# Patient Record
Sex: MALE | Race: WHITE | NOT HISPANIC OR LATINO | Employment: FULL TIME | URBAN - METROPOLITAN AREA
[De-identification: names, ages, dates, MRNs, and addresses within clinical notes are randomized per-mention and may not be internally consistent; named-entity substitution may affect disease eponyms.]

---

## 2022-11-30 ENCOUNTER — OFFICE VISIT (OUTPATIENT)
Dept: FAMILY MEDICINE CLINIC | Facility: CLINIC | Age: 42
End: 2022-11-30

## 2022-11-30 VITALS
HEART RATE: 72 BPM | HEIGHT: 76 IN | SYSTOLIC BLOOD PRESSURE: 124 MMHG | DIASTOLIC BLOOD PRESSURE: 80 MMHG | BODY MASS INDEX: 38.36 KG/M2 | WEIGHT: 315 LBS | TEMPERATURE: 96.6 F | OXYGEN SATURATION: 96 %

## 2022-11-30 DIAGNOSIS — L91.8 MULTIPLE ACQUIRED SKIN TAGS: ICD-10-CM

## 2022-11-30 DIAGNOSIS — R79.89 LOW TESTOSTERONE IN MALE: ICD-10-CM

## 2022-11-30 DIAGNOSIS — Z11.4 ENCOUNTER FOR SCREENING FOR HIV: ICD-10-CM

## 2022-11-30 DIAGNOSIS — Z11.59 SCREENING FOR VIRAL DISEASE: ICD-10-CM

## 2022-11-30 DIAGNOSIS — Z11.3 ROUTINE SCREENING FOR STI (SEXUALLY TRANSMITTED INFECTION): Primary | ICD-10-CM

## 2022-11-30 DIAGNOSIS — Z23 NEED FOR INFLUENZA VACCINATION: ICD-10-CM

## 2022-11-30 DIAGNOSIS — Z11.59 ENCOUNTER FOR HEPATITIS C SCREENING TEST FOR LOW RISK PATIENT: ICD-10-CM

## 2022-11-30 DIAGNOSIS — N52.9 ERECTILE DYSFUNCTION, UNSPECIFIED ERECTILE DYSFUNCTION TYPE: ICD-10-CM

## 2022-11-30 PROCEDURE — 99203 OFFICE O/P NEW LOW 30 MIN: CPT | Performed by: NURSE PRACTITIONER

## 2022-11-30 PROCEDURE — 90471 IMMUNIZATION ADMIN: CPT | Performed by: NURSE PRACTITIONER

## 2022-11-30 PROCEDURE — 90686 IIV4 VACC NO PRSV 0.5 ML IM: CPT | Performed by: NURSE PRACTITIONER

## 2022-12-01 DIAGNOSIS — Z11.59 ENCOUNTER FOR HEPATITIS C SCREENING TEST FOR LOW RISK PATIENT: ICD-10-CM

## 2022-12-01 DIAGNOSIS — Z11.59 SCREENING FOR VIRAL DISEASE: ICD-10-CM

## 2022-12-01 DIAGNOSIS — Z11.3 ROUTINE SCREENING FOR STI (SEXUALLY TRANSMITTED INFECTION): ICD-10-CM

## 2022-12-01 DIAGNOSIS — Z11.4 ENCOUNTER FOR SCREENING FOR HIV: ICD-10-CM

## 2022-12-01 DIAGNOSIS — R79.89 LOW TESTOSTERONE IN MALE: ICD-10-CM

## 2022-12-02 ENCOUNTER — PATIENT ROUNDING (BHMG ONLY) (OUTPATIENT)
Dept: FAMILY MEDICINE CLINIC | Facility: CLINIC | Age: 42
End: 2022-12-02

## 2022-12-02 LAB
C TRACH RRNA SPEC QL NAA+PROBE: NEGATIVE
N GONORRHOEA RRNA SPEC QL NAA+PROBE: NEGATIVE
T VAGINALIS RRNA SPEC QL NAA+PROBE: NEGATIVE

## 2022-12-02 NOTE — PROGRESS NOTES
A [employer] message has been sent to patient rounding with [facility].

## 2022-12-04 NOTE — ASSESSMENT & PLAN NOTE
BMI 39.95 today.  Recommended annual physical w/labs within the next 1 to 3 months; Patient declined recommendation and requested annual physical in 1 year.

## 2022-12-29 ENCOUNTER — TELEPHONE (OUTPATIENT)
Dept: FAMILY MEDICINE CLINIC | Facility: CLINIC | Age: 42
End: 2022-12-29

## 2022-12-29 NOTE — TELEPHONE ENCOUNTER
Caller: Roberto Carlos Mar    Relationship: Self    Best call back number: [phone]    What is the medical concern/diagnosis: PAIN IN BACK, HISTORY OF HERNIATED DISCS    What specialty or service is being requested: ORTHOPEDIC     What is the provider, practice or medical service name: WHOEVER TUNDE LAWRENCE WOULD RECOMMEND     What is the office location:     What is the office phone number:     Any additional details: PREVIOUSLY RECEIVED CORTISONE SHOTS FOR THIS.

## 2023-01-11 ENCOUNTER — OFFICE VISIT (OUTPATIENT)
Dept: FAMILY MEDICINE CLINIC | Facility: CLINIC | Age: 43
End: 2023-01-11
Payer: COMMERCIAL

## 2023-01-11 VITALS
BODY MASS INDEX: 38.36 KG/M2 | HEART RATE: 65 BPM | WEIGHT: 315 LBS | OXYGEN SATURATION: 98 % | SYSTOLIC BLOOD PRESSURE: 120 MMHG | HEIGHT: 76 IN | DIASTOLIC BLOOD PRESSURE: 60 MMHG

## 2023-01-11 DIAGNOSIS — M54.50 LOW BACK PAIN RADIATING TO RIGHT LEG: Primary | ICD-10-CM

## 2023-01-11 DIAGNOSIS — M79.604 LOW BACK PAIN RADIATING TO RIGHT LEG: Primary | ICD-10-CM

## 2023-01-11 PROCEDURE — 99213 OFFICE O/P EST LOW 20 MIN: CPT | Performed by: NURSE PRACTITIONER

## 2023-01-11 NOTE — PROGRESS NOTES
Chief Complaint  Back Pain (2 and half weeks ago. Its an old injury.)    Subjective          Roberto Carlos Mar presents to [facility]  History of Present Illness  Roberto Carlos Mar is a 42 year old male here with c/o of low back pain.  Over 20 years ago he thinks he injured his back at the gym and did not notice at the time until the next day.  Back then he lived in [place] and they did back scans on his back and was told he had DDD in lower back.  About 8 years ago, when he lived in [place], he had another scan and was told he had about 2 herniated discs in lower back.  He does not have records from these images.  In [place], when pain got bad he got steroid shots in spine and it would help totally take away pain.  Recently about 2.5 weeks ago he was cleaning the house and the next day he had difficulty getting out of bed.  About 2 weeks ago after he couldn't get out of bed he called a telehealth service who prescribed Ibuprofen 800mg and muscle relaxer and that helped a little.  Pain level today 2/10 when standing, when sit 6/8-10.  Last night when trying to sit on the couch his pain level was pushing a 10/10.  Also he have difficulty sleeping, just hard to move at this point.  Laying flat on the floor and standing helps relieve pain.  Pain radiates down right leg down to knee or mid shin.  Pain feels like pinching, tight, burning, and shooting.  He has been living in [place] now for about 2-3 months and has never seen anyone for this problem since he moved here. He reports going to physical therapy in the past but did not feel like it help but that was a long time ago.          Review of Systems   Constitutional: Negative for chills.   Respiratory: Negative for shortness of breath.    Cardiovascular: Negative for palpitations and leg swelling.   Gastrointestinal: Negative for constipation and diarrhea.   Genitourinary: Negative for difficulty urinating.   Musculoskeletal: Positive for back  "pain and gait problem. Negative for arthralgias, joint swelling, myalgias, neck pain and neck stiffness.        When pain severe causes him to limp.  Pain from low back down right leg to knee or mid shin.         Objective   Vital Signs:   /60 (BP Location: Left arm)   Pulse 65   Ht 193 cm (76\")   Wt (!) 151 kg (332 lb 9.6 oz)   SpO2 98%   BMI 40.49 kg/m²     Physical Exam  Vitals and nursing note reviewed.   Constitutional:       Appearance: Normal appearance.   HENT:      Head: Normocephalic and atraumatic.   Cardiovascular:      Rate and Rhythm: Normal rate and regular rhythm.      Heart sounds: Normal heart sounds. No murmur heard.  Pulmonary:      Effort: Pulmonary effort is normal.      Breath sounds: Normal breath sounds. No wheezing or rhonchi.   Musculoskeletal:         General: No swelling or tenderness. Normal range of motion.      Lumbar back: No swelling, edema, signs of trauma or tenderness. Normal range of motion. Negative right straight leg raise test and negative left straight leg raise test.        Back:       Right lower leg: No edema.      Left lower leg: No edema.      Comments: Lumbar ROM normal.   Skin:     General: Skin is warm and dry.   Neurological:      Mental Status: He is alert.   Psychiatric:         Mood and Affect: Mood normal.        Result Review :   The following data was reviewed by: TUNDE Lebron on 01/11/2023:  CBC w/diff    CBC w/Diff 12/2/22   WBC 4.10   RBC 5.30   Hemoglobin 16.7   Hematocrit 50.8   MCV 95.8   MCH 31.5   MCHC 32.9   RDW 12.6   Platelets 294   Neutrophil Rel % 45.3   Lymphocyte Rel % 39.8   Monocyte Rel % 11.5   Eosinophil Rel % 1.7   Basophil Rel % 1.7 (A)   (A) Abnormal value                      Assessment and Plan    Diagnoses and all orders for this visit:    1. Low back pain radiating to right leg (Primary)  Assessment & Plan:  Chronic intermittent low back pain.  Continue Ibuprofen 800mg (recent RX) PRN pain.  Discussed importance " of eating with medication.  Alternate ice/heat.  Discussed and recommended referral to physical therapy and patient agreed.  ORDER:  X-ray Lumbar Spine  Follow-up in 2 months for re-evaluation.    Orders:  -     Ambulatory Referral to Physical Therapy Evaluate and treat  -     XR Spine Lumbar 2 or 3 View    I spent 20 minutes caring for Roberto Carlos on this date of service. This time includes time spent by me in the following activities:preparing for the visit, performing a medically appropriate examination and/or evaluation , counseling and educating the patient/family/caregiver, ordering medications, tests, or procedures and documenting information in the medical record  Follow Up   Return in about 2 months (around 3/11/2023) for Next scheduled follow up Low Back Pain .  Patient was given instructions and counseling regarding his condition or for health maintenance advice. Please see specific information pulled into the AVS if appropriate.

## 2023-01-15 ENCOUNTER — TELEPHONE (OUTPATIENT)
Dept: FAMILY MEDICINE CLINIC | Facility: CLINIC | Age: 43
End: 2023-01-15
Payer: COMMERCIAL

## 2023-01-15 NOTE — TELEPHONE ENCOUNTER
Called patient and discussed Lumbar x-ray still pending.  He said he was going to get x-ray sometime this week.  I also discussed his kidney blood levels are elevated and recommended he stop taking Ibuprofen 800mg and to take Tylenol instead and he agreed.

## 2023-01-15 NOTE — ASSESSMENT & PLAN NOTE
Chronic intermittent low back pain.  Continue Ibuprofen 800mg (recent RX) PRN pain.  Discussed importance of eating with medication.  Alternate ice/heat.  Discussed and recommended referral to physical therapy and patient agreed.  ORDER:  X-ray Lumbar Spine  Follow-up in 2 months for re-evaluation.

## 2023-01-17 ENCOUNTER — HOSPITAL ENCOUNTER (OUTPATIENT)
Dept: GENERAL RADIOLOGY | Facility: HOSPITAL | Age: 43
Discharge: HOME | End: 2023-01-17
Admitting: NURSE PRACTITIONER
Payer: COMMERCIAL

## 2023-01-17 PROCEDURE — 72100 X-RAY EXAM L-S SPINE 2/3 VWS: CPT

## 2023-01-19 DIAGNOSIS — M54.50 LOW BACK PAIN RADIATING TO RIGHT LEG: Primary | ICD-10-CM

## 2023-01-19 DIAGNOSIS — M79.604 LOW BACK PAIN RADIATING TO RIGHT LEG: Primary | ICD-10-CM

## 2023-01-20 ENCOUNTER — TELEPHONE (OUTPATIENT)
Dept: FAMILY MEDICINE CLINIC | Facility: CLINIC | Age: 43
End: 2023-01-20
Payer: COMMERCIAL

## 2023-01-20 NOTE — TELEPHONE ENCOUNTER
Hub please inform patient  I called patient and left voicemail.  Please call patient and let him know that I sent the referral over to Orthopedic specialist and that they will call him directly to schedule appointment.

## 2023-01-20 NOTE — TELEPHONE ENCOUNTER
----- Message from TUNDE Lebron sent at 1/19/2023 11:54 PM EST -----  Please call patient and let him know that I sent the referral over to Orthopedic specialist and that they will call him directly to schedule appointment.    Thank you,    TUNDE Suárez

## 2023-02-05 PROCEDURE — 87086 URINE CULTURE/COLONY COUNT: CPT | Performed by: NURSE PRACTITIONER

## 2023-02-13 ENCOUNTER — OFFICE VISIT (OUTPATIENT)
Dept: ORTHOPEDIC SURGERY | Facility: CLINIC | Age: 43
End: 2023-02-13
Payer: COMMERCIAL

## 2023-02-13 VITALS — TEMPERATURE: 97.1 F | WEIGHT: 315 LBS | HEIGHT: 76 IN | BODY MASS INDEX: 38.36 KG/M2

## 2023-02-13 DIAGNOSIS — M79.604 LOW BACK PAIN RADIATING TO RIGHT LEG: Primary | ICD-10-CM

## 2023-02-13 DIAGNOSIS — M54.50 LOW BACK PAIN RADIATING TO RIGHT LEG: Primary | ICD-10-CM

## 2023-02-13 PROCEDURE — 99214 OFFICE O/P EST MOD 30 MIN: CPT | Performed by: NURSE PRACTITIONER

## 2023-02-13 RX ORDER — METHYLPREDNISOLONE 4 MG/1
TABLET ORAL
Qty: 21 TABLET | Refills: 0 | Status: SHIPPED | OUTPATIENT
Start: 2023-02-13

## 2023-02-13 NOTE — PROGRESS NOTES
Patient Name: Roberto Carlos Mar   YOB: 1980  Referring Primary Care Physician: Zeenat Rice APRN      Chief Complaint:    Chief Complaint   Patient presents with   • Lumbar Spine - Initial Evaluation, Pain        HPI:  Roebrto Carlos Mar is a 42 y.o. male who presents to [facility] for evaluation of low back pain referring to right lower extremity.  He has a long history of chronic back problems.  He recently moved from [place] where he had been getting epidurals for about 8 years.  He says he will have a flareup about once per year and epidurals generally will turn around.  He has been in therapy recently and several times in the past.  He continues to exercises on his own.  He does state the right lower extremity pain is new and current flareup has been going on 7 to 8 weeks without injury.  He has tried muscle relaxants and OTC medications without much relief.  This is a new patient to the practice new to me.  Prior pertinent records were reviewed.    PFSH:  See attached    ROS: As per HPI, otherwise negative    Objective:    Vitals:    02/13/23 1345   Temp: 97.1 °F (36.2 °C)     Body mass index is 38.85 kg/m².      Physical Exam  Neurological:      Gait: Gait is intact.       Spine Musculoskeletal Exam    Gait    Gait is normal.    Palpation    Thoracolumbar    Tenderness: present    Strength    Thoracolumbar    Thoracolumbar motor exam is normal.     Sensory    Thoracolumbar    Thoracolumbar sensation is normal.    Reflexes    Right      Quadriceps: 0/4      Achilles: 0/4     Left      Quadriceps: 0/4      Achilles: 0/4    Special Tests    Thoracolumbar      Right      SLR: pain radiates to right leg      SLR pain at: 90 degrees      Left      SLR: no back or leg pain        IMAGING:      No imaging in office today.  He did have lumbar AP and lateral films on 1/17/2023 which revealed maintenance of normal lordosis, mild retrolisthesis L3 on L4, L4 on L5  and L5 on S1, disc base narrowing most pronounced L4-5 and L5-S1, multilevel facet arthropathy.  Agree with report    Assessment:           Diagnoses and all orders for this visit:    1. Low back pain radiating to right leg (Primary)  -     MRI Lumbar Spine Without Contrast; Future    Other orders  -     methylPREDNISolone (MEDROL) 4 MG dose pack; Use as directed by package instructions  Dispense: 21 tablet; Refill: 0             Plan:  No loss of nerve function on exam, however with persistence of symptoms despite physical therapy and OTC medications, we will get a lumbar MRI.  He says his last lumbar MRI was probably 8 years ago in [place].  We will have him follow-up afterwards and likely refer to pain management to continue epidural injections since they have worked well for him in the past.  Of course if there is anything of surgical significance, we will get him into [facility] neurosurgery.  We also discussed weight loss for his overall health and chronic back issues and he is fully aware and tries to stay active.  For the current flareup, will prescribe Medrol Dosepak, side effects discussed.  Discussed trialing muscle relaxants as well but he states they never worked well for him in the past.    Return for After radiographic tests.    The diagnosis(es), natural history, pathophysiology and treatment for diagnosis(es) were discussed. Opportunity given and questions answered. Biomechanics of pertinent body areas discussed.    EXERCISES:  Advice on benefits of, and types of regular/moderate exercise pertaining to diagnosis.  Continue HEP.  MEDICATIONS:  The risks, benefits, warnings,side effects and alternatives of medications discussed. Advised against long term use of narcotics.   PAIN CONTROL:  Cold, heat, OTC lidocaine patches and ointment as needed.  MEDICAL RECORDS reviewed from other provider(s) for past and current medical history pertinent to this visit..

## 2023-04-18 ENCOUNTER — TELEPHONE (OUTPATIENT)
Dept: ORTHOPEDIC SURGERY | Facility: CLINIC | Age: 43
End: 2023-04-18
Payer: COMMERCIAL

## 2023-04-18 DIAGNOSIS — M51.16 LUMBAR DISC HERNIATION WITH RADICULOPATHY: Primary | ICD-10-CM

## 2023-04-18 NOTE — TELEPHONE ENCOUNTER
Called patient with results, although I do not have an official radiology interpretation, he has a large disc herniation to the right with caudal migration at L3-4.  Likely his symptomatic level.  He has no left leg pain.  He was getting epidural injections in [place].  He says his pain is fairly stable right now but would like the referral to pain management to try epidural injections when this flares back up.  He also understands that if he develops any weakness or epidurals fail, he can call and we will get him into one of the neurosurgeons.  He has already gone through physical therapy and continues those exercises.

## 2023-05-01 ENCOUNTER — OFFICE VISIT (OUTPATIENT)
Dept: PAIN MEDICINE | Facility: CLINIC | Age: 43
End: 2023-05-01
Payer: COMMERCIAL

## 2023-05-01 VITALS
OXYGEN SATURATION: 96 % | RESPIRATION RATE: 18 BRPM | BODY MASS INDEX: 38.36 KG/M2 | HEIGHT: 76 IN | WEIGHT: 315 LBS | HEART RATE: 81 BPM | DIASTOLIC BLOOD PRESSURE: 88 MMHG | TEMPERATURE: 98 F | SYSTOLIC BLOOD PRESSURE: 135 MMHG

## 2023-05-01 DIAGNOSIS — M51.26 DISPLACEMENT OF LUMBAR INTERVERTEBRAL DISC WITHOUT MYELOPATHY: Primary | ICD-10-CM

## 2023-05-01 DIAGNOSIS — M54.16 LUMBAR RADICULOPATHY: ICD-10-CM

## 2023-05-01 DIAGNOSIS — M51.36 DDD (DEGENERATIVE DISC DISEASE), LUMBAR: ICD-10-CM

## 2023-05-01 PROBLEM — M51.369 DDD (DEGENERATIVE DISC DISEASE), LUMBAR: Status: ACTIVE | Noted: 2023-05-01

## 2023-05-01 NOTE — PROGRESS NOTES
CHIEF COMPLAINT  Mr. Mar has had low back pain for over 20 years.     Subjective   Roberto Carlos Mar is a 43 y.o. male.   He presents to the office for initial evaluation of low back and leg pain. He was referred here by TUNDE Escobar.  This patient has a longstanding history of low back pain dating over 20 years that is intermittent in its occurrences.  The patient states that there is not any specific thing that will trigger onset of pain which usually will shruti after several days however when he has severe occurrences they can last up to 2 months.  Patient illustrates primarily right-sided lumbosacral spine pain which radiates into the anterior right thigh and radiates into the lower extremity terminating in the mid calf region.  Pain is described as a sharp, burning sensation which feels like railroad spikes being driven into his lower back.  Also reports that his leg pain is with concomitant tingling and occasionally numbness and very rare occurrences of weakness.  Denies bladder or bowel incontinence.    Patient denies any previous history of cervical or lumbar spine surgery.    He has been in interventional pain management while living in [place] where he obtains therapeutic LESI's which have been very helpful in helping him to control his pain.  The patient believes that the last time he obtained a LESI was approximately 4 years ago.    Completed a formalized course of physical therapy with mixed results.  The patient does perform physician guided HEP along with stretching exercises on a daily basis.  He will also alternate with ice and heat therapy when he has 6 significant flares of pain.    Pain today 0/10 VAS in severity.        Back Pain  This is a recurrent problem. The current episode started more than 1 year ago. The problem occurs intermittently. The problem has been waxing and waning since onset. The pain is present in the lumbar spine. The quality of the pain is described as burning and  "shooting (sharp). Radiates to: RLE. The pain is at a severity of 0/10. The patient is experiencing no pain. The symptoms are aggravated by position. Associated symptoms include leg pain, numbness (right thigh) and tingling. Pertinent negatives include no weakness. He has tried heat, bed rest, home exercises, ice and NSAIDs for the symptoms. The treatment provided no relief.        PEG Assessment   What number best describes your pain on average in the past week?0  What number best describes how, during the past week, pain has interfered with your enjoyment of life?0  What number best describes how, during the past week, pain has interfered with your general activity?  0        Current Outpatient Medications:   •  Testosterone Cypionate (DEPOTESTOTERONE CYPIONATE) 200 MG/ML injection, Inject 1 mL into the appropriate muscle as directed by prescriber., Disp: , Rfl:     The following portions of the patient's history were reviewed and updated as appropriate: allergies, current medications, past family history, past medical history, past social history, past surgical history and problem list.      REVIEW OF PERTINENT MEDICAL DATA              Review of Systems   Gastrointestinal: Negative for constipation and diarrhea.   Genitourinary: Negative for difficulty urinating.   Musculoskeletal: Negative for back pain.   Neurological: Positive for tingling and numbness (right thigh). Negative for weakness.   Psychiatric/Behavioral: Negative for sleep disturbance and suicidal ideas. The patient is not nervous/anxious.      I have reviewed and confirmed the accuracy of the ROS as documented by the MA/LPN/RN DENITA Marks      Vitals:    05/01/23 0800   BP: 135/88   Pulse: 81   Resp: 18   Temp: 98 °F (36.7 °C)   SpO2: 96%   Weight: (!) 147 kg (323 lb 12.6 oz)   Height: 193 cm (76\")   PainSc: 0-No pain         Objective   Physical Exam  Vitals and nursing note reviewed.   Constitutional:       Appearance: Normal appearance. "   HENT:      Head: Normocephalic.   Pulmonary:      Effort: Pulmonary effort is normal.   Musculoskeletal:      Lumbar back: Decreased range of motion.        Back:       Comments: Tenderness to palpation is not reproducible   Skin:     General: Skin is warm and dry.   Neurological:      General: No focal deficit present.      Mental Status: He is alert and oriented to person, place, and time.      Cranial Nerves: Cranial nerves 2-12 are intact.      Sensory: Sensory deficit (mild decreased to sensation along lateral aspect of right thigh) present.      Motor: Motor function is intact.      Gait: Gait is intact.      Deep Tendon Reflexes:      Reflex Scores:       Patellar reflexes are 0 on the right side and 0 on the left side.       Achilles reflexes are 0 on the right side and 0 on the left side.  Psychiatric:         Mood and Affect: Mood normal.         Behavior: Behavior normal.         Thought Content: Thought content normal.         Judgment: Judgment normal.         Assessment & Plan   Diagnoses and all orders for this visit:    1. Displacement of lumbar intervertebral disc without myelopathy (Primary)    2. Lumbar radiculopathy    3. DDD (degenerative disc disease), lumbar        --- Follow-up as needed as patient will contact the office whenever he experiences the flare of low back and right lower extremity pain in order to come in for therapeutic LESI.  --- I will have the patient's sign a medical release in order for us to obtain his records from his pain management practice in [place].       Pain / Disability Scale    The scale used for measurement of pain and/or disability for this patient was the [place] back pain disability scale.  The score was 0 on 05/01/2023      MARY REPORT    MARY report has been reviewed and scanned into the patient's chart.    As the clinician, I personally reviewed the MARY from 5/1/2023 while the patient was in the office today.         Dictated utilizing Dragon  dictation.

## 2023-08-11 NOTE — PROGRESS NOTES
HEMATOLOGY ONCOLOGY OUTPATIENT CONSULTATION       Patient name: Roberto Carlos Mar  : 1980  MRN: [id number]  Primary Care Physician: Zeenat Rice APRN  Referring Physician: Zeenat Rice APRN  Reason For Consult: Polycythemia      History of Present Illness:  Patient is a 43 y.o. male who was referred for polycythemia.  Patient history of hypogonadism and has used testosterone supplementation.    Patient has history of fatigue, he denies any history of smoking he has had some weight loss over the last few weeks.  His last testosterone was few weeks prior.  He has been using 0.8 mL intramuscular weekly.  Patient is also been donating blood his last blood donation was in 2023 -WBC 4.5, hemoglobin 18.7, hematocrit 53.7    2023 -WBC 5.92, hemoglobin 16.2, hematocrit 47.2    Subjective:  Patient presents for initial consultation       Past Medical History:   Diagnosis Date    Erectile dysfunction 10/1/2022    Lumbosacral disc disease 2001       Past Surgical History:   Procedure Laterality Date    NO PAST SURGERIES         No current outpatient medications on file.    No Known Allergies    Family History   Problem Relation Age of Onset    Diabetes Father         Type 2       Cancer-related family history is not on file.      Social History     Tobacco Use    Smoking status: Never     Passive exposure: Never    Smokeless tobacco: Never   Vaping Use    Vaping Use: Never used   Substance Use Topics    Alcohol use: Yes     Alcohol/week: 4.0 standard drinks     Types: 4 Cans of beer per week    Drug use: Never     Social History     Social History Narrative    Not on file       ROS:   Review of Systems   Constitutional:  Positive for fatigue. Negative for fever.   HENT:  Negative for congestion and nosebleeds.    Eyes:  Negative for pain.   Respiratory:  Negative for cough and shortness of breath.    Cardiovascular:  Negative for chest pain.  "  Gastrointestinal:  Negative for abdominal pain, blood in stool, diarrhea, nausea and vomiting.   Endocrine: Negative for cold intolerance and heat intolerance.   Genitourinary:  Negative for difficulty urinating.   Musculoskeletal:  Negative for arthralgias.   Skin:  Negative for rash.   Neurological:  Negative for dizziness, weakness and headaches.   Hematological:  Does not bruise/bleed easily.   Psychiatric/Behavioral:  Negative for behavioral problems.        Objective:    Vital Signs:  Vitals:    08/14/23 1031   BP: 136/80   Pulse: 64   Resp: 16   Temp: 98.4 øF (36.9 øC)   SpO2: 98%   Weight: (!) 142 kg (312 lb 12.8 oz)   Height: 193 cm (76\")   PainSc: 0-No pain     Body mass index is 38.08 kg/mý.    ECOG  (1) Restricted in physically strenuous activity, ambulatory and able to do work of light nature    Physical Exam:   Physical Exam  Constitutional:       Appearance: Normal appearance.   HENT:      Head: Normocephalic and atraumatic.   Eyes:      Pupils: Pupils are equal, round, and reactive to light.   Cardiovascular:      Rate and Rhythm: Normal rate and regular rhythm.      Pulses: Normal pulses.      Heart sounds: No murmur heard.  Pulmonary:      Effort: Pulmonary effort is normal.      Breath sounds: Normal breath sounds.   Abdominal:      General: There is no distension.      Palpations: Abdomen is soft. There is no mass.      Tenderness: There is no abdominal tenderness.   Musculoskeletal:         General: Normal range of motion.      Cervical back: Normal range of motion.   Skin:     General: Skin is warm.   Neurological:      General: No focal deficit present.      Mental Status: He is alert.   Psychiatric:         Mood and Affect: Mood normal.       Lab Results - Last 18 Months   Lab Units 08/14/23  1013 12/02/22  0849   WBC 10*3/mm3 5.92 4.10   HEMOGLOBIN g/dL 16.2 16.7   HEMATOCRIT % 47.2 50.8   PLATELETS 10*3/mm3 221 294   MCV fL 94.2 95.8     Lab Results - Last 18 Months   Lab Units " 12/02/22  0849   SODIUM mmol/L 139   POTASSIUM mmol/L 5.1   CHLORIDE mmol/L 102   CO2 mmol/L 26.5   BUN mg/dL 18   CREATININE mg/dL 1.35*   CALCIUM mg/dL 9.7   BILIRUBIN mg/dL 0.5   ALK PHOS U/L 56   ALT (SGPT) U/L 37   AST (SGOT) U/L 21   GLUCOSE mg/dL 91       Lab Results   Component Value Date    GLUCOSE 91 12/02/2022    BUN 18 12/02/2022    CREATININE 1.35 (H) 12/02/2022    BCR 13.3 12/02/2022    K 5.1 12/02/2022    CO2 26.5 12/02/2022    CALCIUM 9.7 12/02/2022    PROTENTOTREF 7.5 12/02/2022    ALBUMIN 5.00 12/02/2022    LABIL2 2.0 12/02/2022    AST 21 12/02/2022    ALT 37 12/02/2022       No results for input(s): APTT, INR, PTT in the last 52146 hours.    No results found for: IRON, TIBC, FERRITIN    No results found for: FOLATE    No results found for: OCCULTBLD    No results found for: RETICCTPCT  No results found for: MOIZHQVC37  No results found for: SPEP, UPEP  No results found for: LDH, URICACID  No results found for: THERESA, RF, SEDRATE  No results found for: FIBRINOGEN, HAPTOGLOBIN  No results found for: PTT, INR  No results found for:   No results found for: CEA  No components found for: CA-19-9  No results found for: PSA  No results found for: SEDRATE       Assessment & Plan     Patient is a 43-year-old male with hypogonadism on testosterone supplementation referred for work-up of erythrocytosis    Erythrocytosis  Patient has secondary erythrocytosis most likely given recent testosterone use   Since his last CBC in June patient has had blood donation and also has stopped testosterone use  Now his hemoglobin is within normal limits hematocrit is less than 50  He denies any significant constitutional symptoms other than fatigue which can be explained by hypogonadism  Sleep study has been ordered for the patient  I have advised the patient that if he needs to be on testosterone supplementation he can start doing periodic phlebotomy/blood donation to keep his hematocrit less than 50  Discussed the risk  of stroke if he has uncontrolled erythrocytosis  No further diagnostic work-up is warranted given his hemoglobin has normalized  I will repeat on follow-up to assess for phlebotomy needs we can also consider adding low-dose aspirin if hematocrit increases again      Thank you very much for providing the opportunity to participate in this patient's care. Please do not hesitate to call if there are any other questions.

## 2023-08-14 ENCOUNTER — APPOINTMENT (OUTPATIENT)
Dept: LAB | Facility: HOSPITAL | Age: 43
End: 2023-08-14
Payer: COMMERCIAL

## 2023-08-14 ENCOUNTER — CONSULT (OUTPATIENT)
Dept: ONCOLOGY | Facility: CLINIC | Age: 43
End: 2023-08-14
Payer: COMMERCIAL

## 2023-08-14 VITALS
RESPIRATION RATE: 16 BRPM | HEIGHT: 76 IN | SYSTOLIC BLOOD PRESSURE: 136 MMHG | WEIGHT: 312.8 LBS | BODY MASS INDEX: 38.09 KG/M2 | DIASTOLIC BLOOD PRESSURE: 80 MMHG | OXYGEN SATURATION: 98 % | HEART RATE: 64 BPM | TEMPERATURE: 98.4 F

## 2023-08-14 DIAGNOSIS — D75.1 POLYCYTHEMIA: Primary | ICD-10-CM

## 2023-08-14 LAB
BASOPHILS # BLD AUTO: 0.04 10*3/MM3 (ref 0–0.2)
BASOPHILS NFR BLD AUTO: 0.7 % (ref 0–1.5)
DEPRECATED RDW RBC AUTO: 43 FL (ref 37–54)
EOSINOPHIL # BLD AUTO: 0.13 10*3/MM3 (ref 0–0.4)
EOSINOPHIL NFR BLD AUTO: 2.2 % (ref 0.3–6.2)
ERYTHROCYTE [DISTWIDTH] IN BLOOD BY AUTOMATED COUNT: 12.7 % (ref 12.3–15.4)
HCT VFR BLD AUTO: 47.2 % (ref 37.5–51)
HGB BLD-MCNC: 16.2 G/DL (ref 13–17.7)
LYMPHOCYTES # BLD AUTO: 1.89 10*3/MM3 (ref 0.7–3.1)
LYMPHOCYTES NFR BLD AUTO: 31.9 % (ref 19.6–45.3)
MCH RBC QN AUTO: 32.3 PG (ref 26.6–33)
MCHC RBC AUTO-ENTMCNC: 34.3 G/DL (ref 31.5–35.7)
MCV RBC AUTO: 94.2 FL (ref 79–97)
MONOCYTES # BLD AUTO: 0.89 10*3/MM3 (ref 0.1–0.9)
MONOCYTES NFR BLD AUTO: 15 % (ref 5–12)
NEUTROPHILS NFR BLD AUTO: 2.97 10*3/MM3 (ref 1.7–7)
NEUTROPHILS NFR BLD AUTO: 50.2 % (ref 42.7–76)
PLATELET # BLD AUTO: 221 10*3/MM3 (ref 140–450)
PMV BLD AUTO: 10.2 FL (ref 6–12)
RBC # BLD AUTO: 5.01 10*6/MM3 (ref 4.14–5.8)
WBC NRBC COR # BLD: 5.92 10*3/MM3 (ref 3.4–10.8)

## 2023-08-14 PROCEDURE — 99204 OFFICE O/P NEW MOD 45 MIN: CPT | Performed by: INTERNAL MEDICINE

## 2023-08-14 PROCEDURE — 85025 COMPLETE CBC W/AUTO DIFF WBC: CPT | Performed by: INTERNAL MEDICINE

## 2023-08-14 NOTE — LETTER
2023     José Miguel Dixon MD  [address] [place] [address] [place] [address]    Patient: Roberto Carlos Mar   YOB: 1980   Date of Visit: 2023     Dear José Miguel Dixon MD:       Thank you for referring Roberto Carlos Mar to me for evaluation. Below are the relevant portions of my assessment and plan of care.    If you have questions, please do not hesitate to call me. I look forward to following Roberto Carlos along with you.         Sincerely,        Soraida Zhao MD        CC: TUNDE Lebron Amitoj, MD  23 1648  Sign when Signing Visit                           HEMATOLOGY ONCOLOGY OUTPATIENT CONSULTATION       Patient name: Roberto Carlos Mar  : 1980  MRN: [id number]  Primary Care Physician: Zeenat Rice APRN  Referring Physician: Zeenat Rice APRN  Reason For Consult: Polycythemia      History of Present Illness:  Patient is a 43 y.o. male who was referred for polycythemia.  Patient history of hypogonadism and has used testosterone supplementation.    Patient has history of fatigue, he denies any history of smoking he has had some weight loss over the last few weeks.  His last testosterone was few weeks prior.  He has been using 0.8 mL intramuscular weekly.  Patient is also been donating blood his last blood donation was in 2023 -WBC 4.5, hemoglobin 18.7, hematocrit 53.7    2023 -WBC 5.92, hemoglobin 16.2, hematocrit 47.2    Subjective:  Patient presents for initial consultation       Past Medical History:   Diagnosis Date    Erectile dysfunction 10/1/2022    Lumbosacral disc disease 2001       Past Surgical History:   Procedure Laterality Date    NO PAST SURGERIES         No current outpatient medications on file.    No Known Allergies    Family History   Problem Relation Age of Onset    Diabetes Father         Type 2       Cancer-related family history is not on file.      Social History     Tobacco Use    Smoking status: Never  "    Passive exposure: Never    Smokeless tobacco: Never   Vaping Use    Vaping Use: Never used   Substance Use Topics    Alcohol use: Yes     Alcohol/week: 4.0 standard drinks     Types: 4 Cans of beer per week    Drug use: Never     Social History     Social History Narrative    Not on file       ROS:   Review of Systems   Constitutional:  Positive for fatigue. Negative for fever.   HENT:  Negative for congestion and nosebleeds.    Eyes:  Negative for pain.   Respiratory:  Negative for cough and shortness of breath.    Cardiovascular:  Negative for chest pain.   Gastrointestinal:  Negative for abdominal pain, blood in stool, diarrhea, nausea and vomiting.   Endocrine: Negative for cold intolerance and heat intolerance.   Genitourinary:  Negative for difficulty urinating.   Musculoskeletal:  Negative for arthralgias.   Skin:  Negative for rash.   Neurological:  Negative for dizziness, weakness and headaches.   Hematological:  Does not bruise/bleed easily.   Psychiatric/Behavioral:  Negative for behavioral problems.        Objective:    Vital Signs:  Vitals:    08/14/23 1031   BP: 136/80   Pulse: 64   Resp: 16   Temp: 98.4 øF (36.9 øC)   SpO2: 98%   Weight: (!) 142 kg (312 lb 12.8 oz)   Height: 193 cm (76\")   PainSc: 0-No pain     Body mass index is 38.08 kg/mý.    ECOG  (1) Restricted in physically strenuous activity, ambulatory and able to do work of light nature    Physical Exam:   Physical Exam  Constitutional:       Appearance: Normal appearance.   HENT:      Head: Normocephalic and atraumatic.   Eyes:      Pupils: Pupils are equal, round, and reactive to light.   Cardiovascular:      Rate and Rhythm: Normal rate and regular rhythm.      Pulses: Normal pulses.      Heart sounds: No murmur heard.  Pulmonary:      Effort: Pulmonary effort is normal.      Breath sounds: Normal breath sounds.   Abdominal:      General: There is no distension.      Palpations: Abdomen is soft. There is no mass.      Tenderness: " There is no abdominal tenderness.   Musculoskeletal:         General: Normal range of motion.      Cervical back: Normal range of motion.   Skin:     General: Skin is warm.   Neurological:      General: No focal deficit present.      Mental Status: He is alert.   Psychiatric:         Mood and Affect: Mood normal.       Lab Results - Last 18 Months   Lab Units 08/14/23  1013 12/02/22  0849   WBC 10*3/mm3 5.92 4.10   HEMOGLOBIN g/dL 16.2 16.7   HEMATOCRIT % 47.2 50.8   PLATELETS 10*3/mm3 221 294   MCV fL 94.2 95.8     Lab Results - Last 18 Months   Lab Units 12/02/22  0849   SODIUM mmol/L 139   POTASSIUM mmol/L 5.1   CHLORIDE mmol/L 102   CO2 mmol/L 26.5   BUN mg/dL 18   CREATININE mg/dL 1.35*   CALCIUM mg/dL 9.7   BILIRUBIN mg/dL 0.5   ALK PHOS U/L 56   ALT (SGPT) U/L 37   AST (SGOT) U/L 21   GLUCOSE mg/dL 91       Lab Results   Component Value Date    GLUCOSE 91 12/02/2022    BUN 18 12/02/2022    CREATININE 1.35 (H) 12/02/2022    BCR 13.3 12/02/2022    K 5.1 12/02/2022    CO2 26.5 12/02/2022    CALCIUM 9.7 12/02/2022    PROTENTOTREF 7.5 12/02/2022    ALBUMIN 5.00 12/02/2022    LABIL2 2.0 12/02/2022    AST 21 12/02/2022    ALT 37 12/02/2022       No results for input(s): APTT, INR, PTT in the last 67640 hours.    No results found for: IRON, TIBC, FERRITIN    No results found for: FOLATE    No results found for: OCCULTBLD    No results found for: RETICCTPCT  No results found for: ADPXKKUV78  No results found for: SPEP, UPEP  No results found for: LDH, URICACID  No results found for: THERESA, RF, SEDRATE  No results found for: FIBRINOGEN, HAPTOGLOBIN  No results found for: PTT, INR  No results found for:   No results found for: CEA  No components found for: CA-19-9  No results found for: PSA  No results found for: SEDRATE       Assessment & Plan     Patient is a 43-year-old male with hypogonadism on testosterone supplementation referred for work-up of erythrocytosis    Erythrocytosis  Patient has secondary  erythrocytosis most likely given recent testosterone use   Since his last CBC in June patient has had blood donation and also has stopped testosterone use  Now his hemoglobin is within normal limits hematocrit is less than 50  He denies any significant constitutional symptoms other than fatigue which can be explained by hypogonadism  Sleep study has been ordered for the patient  I have advised the patient that if he needs to be on testosterone supplementation he can start doing periodic phlebotomy/blood donation to keep his hematocrit less than 50  Discussed the risk of stroke if he has uncontrolled erythrocytosis  No further diagnostic work-up is warranted given his hemoglobin has normalized  I will repeat on follow-up to assess for phlebotomy needs we can also consider adding low-dose aspirin if hematocrit increases again      Thank you very much for providing the opportunity to participate in this patient's care. Please do not hesitate to call if there are any other questions.

## 2023-08-17 ENCOUNTER — PATIENT ROUNDING (BHMG ONLY) (OUTPATIENT)
Dept: ONCOLOGY | Facility: CLINIC | Age: 43
End: 2023-08-17
Payer: COMMERCIAL

## 2023-08-17 NOTE — PROGRESS NOTES
August 17, 2023    Hello, may I speak with Roberto Carlos Mar?    My name is Natividad Barnard      I am  with [facility]  [address] [facility] [place] [address] [phone].    Before we get started may I verify your date of birth? 1980    I am calling to officially welcome you to our practice and ask about your recent visit. Is this a good time to talk? no    Tell me about your visit with us. What things went well?  A My Chart message was sent to the patient.        We're always looking for ways to make our patients' experiences even better. Do you have recommendations on ways we may improve?  no    Overall were you satisfied with your first visit to our practice? yes       I appreciate you taking the time to speak with me today. Is there anything else I can do for you? no      Thank you, and have a great day.

## 2023-08-21 ENCOUNTER — OFFICE VISIT (OUTPATIENT)
Dept: FAMILY MEDICINE CLINIC | Facility: CLINIC | Age: 43
End: 2023-08-21
Payer: COMMERCIAL

## 2023-08-21 VITALS
HEIGHT: 76 IN | WEIGHT: 315 LBS | HEART RATE: 77 BPM | RESPIRATION RATE: 16 BRPM | BODY MASS INDEX: 38.36 KG/M2 | OXYGEN SATURATION: 96 % | SYSTOLIC BLOOD PRESSURE: 126 MMHG | DIASTOLIC BLOOD PRESSURE: 76 MMHG

## 2023-08-21 DIAGNOSIS — R79.89 ELEVATED SERUM CREATININE: Primary | ICD-10-CM

## 2023-08-21 PROCEDURE — 99213 OFFICE O/P EST LOW 20 MIN: CPT | Performed by: NURSE PRACTITIONER

## 2023-08-21 NOTE — PROGRESS NOTES
"Chief Complaint  Kidney Follow-up (Kidney levels high in blood work )    Subjective          Roberto Carlos Mar presents to [facility] for  History of Present Illness  He is here to discuss his elevated Creatinine level on recent lab.  He reports his last creatinine level was 1.31.   His prior lab results from 12/2022 showed creatinine 1.35.  He reports was taking ibuprofen 600-800mg 1-2 times monthly every now and then for headache pain approximately monthly over his lifetime.  He started Testosterone injections in 2/2023 weekly through July, 2023.  He was taking something else (did not recall name) for about 2 months to help increase testosterone levels.  He has since stopped Testosterone injections.  He has a follow-up appointment to see [facility], who is following his Testosterone and Creatinine level.    Review of Systems   Respiratory:  Negative for shortness of breath.    Cardiovascular:  Negative for chest pain, palpitations and leg swelling.   Genitourinary:  Negative for decreased urine volume and difficulty urinating.   Neurological:  Negative for dizziness and light-headedness.       Objective   Vital Signs:   /76 (BP Location: Right arm, Patient Position: Sitting, Cuff Size: Large Adult)   Pulse 77   Resp 16   Ht 193 cm (76\")   Wt (!) 145 kg (319 lb)   SpO2 96%   BMI 38.83 kg/m²     Physical Exam  Vitals and nursing note reviewed.   Constitutional:       General: He is not in acute distress.     Appearance: Normal appearance.   HENT:      Head: Normocephalic and atraumatic.   Eyes:      Conjunctiva/sclera: Conjunctivae normal.   Cardiovascular:      Rate and Rhythm: Normal rate and regular rhythm.      Heart sounds: Normal heart sounds. No murmur heard.  Pulmonary:      Effort: Pulmonary effort is normal. No respiratory distress.      Breath sounds: Normal breath sounds. No wheezing.   Musculoskeletal:      Right lower leg: No edema.      Left lower leg: No " edema.   Skin:     General: Skin is warm and dry.      Findings: No erythema.   Neurological:      General: No focal deficit present.      Mental Status: He is alert.   Psychiatric:         Mood and Affect: Mood normal.      Result Review :   The following data was reviewed by: TUNDE Lebron on 08/21/2023:  CMP          12/2/2022    08:49   CMP   Glucose 91    BUN 18    Creatinine 1.35    Sodium 139    Potassium 5.1    Chloride 102    Calcium 9.7    Total Protein 7.5    Albumin 5.00    Globulin 2.5    Total Bilirubin 0.5    Alkaline Phosphatase 56    AST (SGOT) 21    ALT (SGPT) 37    BUN/Creatinine Ratio 13.3      Data reviewed : Consultant notes [facility] 6/27/23.           Assessment and Plan    Diagnoses and all orders for this visit:    1. Elevated serum creatinine (Primary)    Reports elevated serum creatinine 1.31 (unable to locate this lab in chart).  Elevated serum creatinine 1.35 on labs from 12/2022.  He reports started Testosterone injections in 2/2023 weekly through July, 2023.  Discussed decreasing NSAIDs.  Increase water intake, decrease caffeine.  He reports being followed by [facility] who is monitoring Testosterone and Creatinine.  Encouraged to continue following up with [facility].        Follow Up   Return in about 3 months (around 11/21/2023) for Annual physical.  Patient was given instructions and counseling regarding his condition or for health maintenance advice. Please see specific information pulled into the AVS if appropriate.

## 2023-09-16 ENCOUNTER — HOSPITAL ENCOUNTER (EMERGENCY)
Facility: HOSPITAL | Age: 43
Discharge: HOME | End: 2023-09-16
Attending: EMERGENCY MEDICINE
Payer: COMMERCIAL

## 2023-09-16 VITALS
RESPIRATION RATE: 18 BRPM | TEMPERATURE: 98 F | BODY MASS INDEX: 42.66 KG/M2 | DIASTOLIC BLOOD PRESSURE: 84 MMHG | OXYGEN SATURATION: 98 % | WEIGHT: 315 LBS | HEIGHT: 72 IN | SYSTOLIC BLOOD PRESSURE: 155 MMHG | HEART RATE: 74 BPM

## 2023-09-16 DIAGNOSIS — S81.812A LACERATION OF LEFT LOWER LEG, INITIAL ENCOUNTER: Primary | ICD-10-CM

## 2023-09-16 PROCEDURE — 99283 EMERGENCY DEPT VISIT LOW MDM: CPT

## 2023-09-16 RX ORDER — HYDROCODONE BITARTRATE AND ACETAMINOPHEN 10; 325 MG/1; MG/1
1 TABLET ORAL ONCE
Status: COMPLETED | OUTPATIENT
Start: 2023-09-16 | End: 2023-09-16

## 2023-09-16 RX ORDER — LIDOCAINE HYDROCHLORIDE AND EPINEPHRINE 10; 10 MG/ML; UG/ML
10 INJECTION, SOLUTION INFILTRATION; PERINEURAL ONCE
Status: COMPLETED | OUTPATIENT
Start: 2023-09-16 | End: 2023-09-16

## 2023-09-16 RX ORDER — CEPHALEXIN 500 MG/1
500 CAPSULE ORAL 2 TIMES DAILY
Qty: 14 CAPSULE | Refills: 0 | Status: SHIPPED | OUTPATIENT
Start: 2023-09-16 | End: 2023-09-23

## 2023-09-16 RX ADMIN — HYDROCODONE BITARTRATE AND ACETAMINOPHEN 1 TABLET: 10; 325 TABLET ORAL at 12:19

## 2023-09-16 RX ADMIN — LIDOCAINE HYDROCHLORIDE AND EPINEPHRINE 10 ML: 10; 10 INJECTION, SOLUTION INFILTRATION; PERINEURAL at 13:51

## 2023-09-16 NOTE — ED PROVIDER NOTES
"Subjective   History of Present Illness  43-year-old male presents for 2 large lower extremity lacerations after fall.  Fell off a trailer.  No loss of consciousness.  Tetanus within the last year.  Went to urgent care where they believe the wounds were passed there level of care and wrapped wounds and sent patient to emergency department.      Review of Systems  Positive for laceration.  Past Medical History:   Diagnosis Date    Erectile dysfunction 10/1/2022    Lumbosacral disc disease 1/1/2001       No Known Allergies    Past Surgical History:   Procedure Laterality Date    NO PAST SURGERIES         Family History   Problem Relation Age of Onset    Diabetes Father         Type 2       Social History     Socioeconomic History    Marital status:    Tobacco Use    Smoking status: Never     Passive exposure: Never    Smokeless tobacco: Never   Vaping Use    Vaping Use: Never used   Substance and Sexual Activity    Alcohol use: Yes     Alcohol/week: 4.0 standard drinks     Types: 4 Cans of beer per week    Drug use: Never    Sexual activity: Yes     Partners: Female     Birth control/protection: Condom           Objective   Physical Exam  Left lower extremity: 2 V-shaped lacerations over anterior left lower leg.  No active bleeding.  Intact PT and DP pulses.  Sensation intact to light touch distally.  Able to flex and extend at ankle without issue.  Procedures           ED Course      /84 (BP Location: Left arm, Patient Position: Sitting)   Pulse 74   Temp 98 °F (36.7 °C) (Oral)   Resp 18   Ht 182.9 cm (72\")   Wt (!) 144 kg (316 lb 12.8 oz)   SpO2 98%   BMI 42.97 kg/m²   Labs Reviewed - No data to display  Medications   HYDROcodone-acetaminophen (NORCO)  MG per tablet 1 tablet (1 tablet Oral Given 9/16/23 1219)   lidocaine 1% - EPINEPHrine 1:054394 (XYLOCAINE W/EPI) 1 %-1:927917 injection 10 mL (10 mL Injection Given 9/16/23 1351)     No radiology results for the last day                      "                  Medical Decision Making  Problems Addressed:  Laceration of left lower leg, initial encounter: complicated acute illness or injury    Risk  Prescription drug management.    15 sutures placed in more proximal wound that measures approximately 8 cm including 2 horizontal mattress and the rest simple interrupted.  6 sutures placed in bottom wound that measures approximately 6 cm including 1 horizontal mattress and the rest simple interrupted.  All 3-0 nylon.    Final diagnoses:   Laceration of left lower leg, initial encounter       ED Disposition  ED Disposition       ED Disposition   Discharge    Condition   Stable    Comment   --               Zeenat Rice, APRN  [address] [facility] [address] [phone]    In 2 weeks  For suture removal         Medication List        New Prescriptions      cephalexin 500 MG capsule  Commonly known as: KEFLEX  Take 1 capsule by mouth 2 (Two) Times a Day for 7 days.               Where to Get Your Medications        These medications were sent to [id number] - [place], [place] - [address] [facility] [address] - [phone]  - [phone] FX  [address] [place] [address]      Phone: [phone]   cephalexin 500 MG capsule            Stephan So MD  09/16/23 8584

## 2023-10-26 ENCOUNTER — OFFICE VISIT (OUTPATIENT)
Dept: WOUND CARE | Facility: HOSPITAL | Age: 43
End: 2023-10-26
Payer: COMMERCIAL

## 2023-10-26 ENCOUNTER — LAB REQUISITION (OUTPATIENT)
Dept: LAB | Facility: HOSPITAL | Age: 43
End: 2023-10-26
Payer: COMMERCIAL

## 2023-10-26 DIAGNOSIS — S81.812A LACERATION WITHOUT FOREIGN BODY, LEFT LOWER LEG, INITIAL ENCOUNTER: ICD-10-CM

## 2023-10-26 PROCEDURE — 87205 SMEAR GRAM STAIN: CPT | Performed by: NURSE PRACTITIONER

## 2023-10-26 PROCEDURE — 87070 CULTURE OTHR SPECIMN AEROBIC: CPT | Performed by: NURSE PRACTITIONER

## 2023-10-26 PROCEDURE — G0463 HOSPITAL OUTPT CLINIC VISIT: HCPCS

## 2023-10-26 PROCEDURE — 97602 WOUND(S) CARE NON-SELECTIVE: CPT

## 2023-10-29 LAB
BACTERIA SPEC AEROBE CULT: NORMAL
GRAM STN SPEC: NORMAL
GRAM STN SPEC: NORMAL

## 2023-11-09 ENCOUNTER — OFFICE VISIT (OUTPATIENT)
Dept: WOUND CARE | Facility: HOSPITAL | Age: 43
End: 2023-11-09
Payer: COMMERCIAL

## 2023-11-10 NOTE — PROGRESS NOTES
HEMATOLOGY ONCOLOGY OUTPATIENT FOLLOW UP       Patient name: Roberto Carlos Mar  : 1980  MRN: [id number]  Primary Care Physician: Zeenat Rice APRN  Referring Physician: No ref. provider found  Reason For Consult: Polycythemia      History of Present Illness:  Patient is a 43 y.o. male who was referred for polycythemia.  Patient history of hypogonadism and has used testosterone supplementation.    Patient has history of fatigue, he denies any history of smoking he has had some weight loss over the last few weeks.  His last testosterone was few weeks prior.  He has been using 0.8 mL intramuscular weekly.  Patient is also been donating blood his last blood donation was in 2023 -WBC 4.5, hemoglobin 18.7, hematocrit 53.7    2023 -WBC 5.92, hemoglobin 16.2, hematocrit 47.2    23 - hb 14.8, hct 45    Subjective:  Patient has had no new symptoms, some increase fatigue with stopping testosterone use.       Past Medical History:   Diagnosis Date    Erectile dysfunction 10/1/2022    Lumbosacral disc disease 2001       Past Surgical History:   Procedure Laterality Date    NO PAST SURGERIES           Current Outpatient Medications:     mupirocin (BACTROBAN) 2 % ointment, Apply 1 application  topically to the appropriate area as directed 3 (Three) Times a Day. (Patient not taking: Reported on 2023), Disp: 30 g, Rfl: 0    No Known Allergies    Family History   Problem Relation Age of Onset    Diabetes Father         Type 2       Cancer-related family history is not on file.      Social History     Tobacco Use    Smoking status: Never     Passive exposure: Never    Smokeless tobacco: Never   Vaping Use    Vaping Use: Never used   Substance Use Topics    Alcohol use: Yes     Alcohol/week: 4.0 standard drinks of alcohol     Types: 4 Cans of beer per week    Drug use: Never     Social History     Social History Narrative    Not on file       ROS:   Review of  "Systems   Constitutional:  Positive for fatigue. Negative for fever.   HENT:  Negative for congestion and nosebleeds.    Eyes:  Negative for pain.   Respiratory:  Negative for cough and shortness of breath.    Cardiovascular:  Negative for chest pain.   Gastrointestinal:  Negative for abdominal pain, blood in stool, diarrhea, nausea and vomiting.   Endocrine: Negative for cold intolerance and heat intolerance.   Genitourinary:  Negative for difficulty urinating.   Musculoskeletal:  Negative for arthralgias.   Skin:  Negative for rash.   Neurological:  Negative for dizziness, weakness and headaches.   Hematological:  Does not bruise/bleed easily.   Psychiatric/Behavioral:  Negative for behavioral problems.          Objective:    Vital Signs:  Vitals:    11/14/23 1135   BP: 145/79   Pulse: 64   Resp: 16   Temp: 97.8 °F (36.6 °C)   SpO2: 99%   Weight: (!) 145 kg (319 lb)   Height: 193 cm (76\")   PainSc: 0-No pain       Body mass index is 38.83 kg/m².    ECOG  (1) Restricted in physically strenuous activity, ambulatory and able to do work of light nature    Physical Exam:   Physical Exam  Constitutional:       Appearance: Normal appearance.   HENT:      Head: Normocephalic and atraumatic.   Eyes:      Pupils: Pupils are equal, round, and reactive to light.   Cardiovascular:      Rate and Rhythm: Normal rate and regular rhythm.      Pulses: Normal pulses.      Heart sounds: No murmur heard.  Pulmonary:      Effort: Pulmonary effort is normal.      Breath sounds: Normal breath sounds.   Abdominal:      General: There is no distension.      Palpations: Abdomen is soft. There is no mass.      Tenderness: There is no abdominal tenderness.   Musculoskeletal:         General: Normal range of motion.      Cervical back: Normal range of motion and neck supple.   Skin:     General: Skin is warm.   Neurological:      General: No focal deficit present.      Mental Status: He is alert.   Psychiatric:         Mood and Affect: Mood " "normal.         Lab Results - Last 18 Months   Lab Units 11/14/23  1130 08/14/23  1013 12/02/22  0849   WBC 10*3/mm3 5.11 5.92 4.10   HEMOGLOBIN g/dL 14.8 16.2 16.7   HEMATOCRIT % 45.2 47.2 50.8   PLATELETS 10*3/mm3 249 221 294   MCV fL 98.5* 94.2 95.8     Lab Results - Last 18 Months   Lab Units 12/02/22  0849   SODIUM mmol/L 139   POTASSIUM mmol/L 5.1   CHLORIDE mmol/L 102   CO2 mmol/L 26.5   BUN mg/dL 18   CREATININE mg/dL 1.35*   CALCIUM mg/dL 9.7   BILIRUBIN mg/dL 0.5   ALK PHOS U/L 56   ALT (SGPT) U/L 37   AST (SGOT) U/L 21   GLUCOSE mg/dL 91       Lab Results   Component Value Date    GLUCOSE 91 12/02/2022    BUN 18 12/02/2022    CREATININE 1.35 (H) 12/02/2022    BCR 13.3 12/02/2022    K 5.1 12/02/2022    CO2 26.5 12/02/2022    CALCIUM 9.7 12/02/2022    PROTENTOTREF 7.5 12/02/2022    ALBUMIN 5.00 12/02/2022    LABIL2 2.0 12/02/2022    AST 21 12/02/2022    ALT 37 12/02/2022       No results for input(s): \"APTT\", \"INR\", \"PTT\" in the last 28885 hours.    No results found for: \"IRON\", \"TIBC\", \"FERRITIN\"    No results found for: \"FOLATE\"    No results found for: \"OCCULTBLD\"    No results found for: \"RETICCTPCT\"  No results found for: \"[id number]\"  No results found for: \"SPEP\", \"UPEP\"  No results found for: \"LDH\", \"URICACID\"  No results found for: \"THERESA\", \"RF\", \"SEDRATE\"  No results found for: \"FIBRINOGEN\", \"HAPTOGLOBIN\"  No results found for: \"PTT\", \"INR\"  No results found for: \"\"  No results found for: \"CEA\"  No components found for: \"CA-19-9\"  No results found for: \"PSA\"  No results found for: \"SEDRATE\"       Assessment & Plan     Patient is a 43-year-old male with hypogonadism on testosterone supplementation referred for work-up of erythrocytosis    Erythrocytosis  Patient has secondary erythrocytosis most likely given recent testosterone use   Since his last CBC in June patient has had blood donation and also has stopped testosterone use  Now his hemoglobin is within normal limits hematocrit is less than " 50  He denies any significant constitutional symptoms other than fatigue which can be explained by hypogonadism  Sleep study has been ordered for the patient this has not been completed yet.  I again have advised the patient that if he needs to be on testosterone supplementation he can start doing periodic phlebotomy/blood donation to keep his hematocrit less than 50  Discussed the risk of stroke if he has uncontrolled erythrocytosis  No further diagnostic work-up is warranted given his hemoglobin has normalized    Now with patient hb continuing to stay normal, will see PRN if he starts testosterone again. He is agreeable with the plan and knows to call us in that situation.      Thank you very much for providing the opportunity to participate in this patient’s care. Please do not hesitate to call if there are any other questions.

## 2023-11-14 ENCOUNTER — LAB (OUTPATIENT)
Dept: LAB | Facility: HOSPITAL | Age: 43
End: 2023-11-14
Payer: COMMERCIAL

## 2023-11-14 ENCOUNTER — OFFICE VISIT (OUTPATIENT)
Dept: ONCOLOGY | Facility: CLINIC | Age: 43
End: 2023-11-14
Payer: COMMERCIAL

## 2023-11-14 VITALS
HEIGHT: 76 IN | HEART RATE: 64 BPM | TEMPERATURE: 97.8 F | OXYGEN SATURATION: 99 % | SYSTOLIC BLOOD PRESSURE: 145 MMHG | RESPIRATION RATE: 16 BRPM | WEIGHT: 315 LBS | BODY MASS INDEX: 38.36 KG/M2 | DIASTOLIC BLOOD PRESSURE: 79 MMHG

## 2023-11-14 DIAGNOSIS — S81.812A LACERATION WITHOUT FOREIGN BODY, LEFT LOWER LEG, INITIAL ENCOUNTER: Primary | ICD-10-CM

## 2023-11-14 DIAGNOSIS — D75.1 POLYCYTHEMIA: Primary | ICD-10-CM

## 2023-11-14 DIAGNOSIS — D75.1 POLYCYTHEMIA: ICD-10-CM

## 2023-11-14 LAB
ANION GAP SERPL CALCULATED.3IONS-SCNC: 14 MMOL/L (ref 5–15)
BASOPHILS # BLD AUTO: 0.07 10*3/MM3 (ref 0–0.2)
BASOPHILS NFR BLD AUTO: 1.4 % (ref 0–1.5)
BUN SERPL-MCNC: 23 MG/DL (ref 6–20)
BUN/CREAT SERPL: 19.2 (ref 7–25)
CALCIUM SPEC-SCNC: 9.6 MG/DL (ref 8.6–10.5)
CHLORIDE SERPL-SCNC: 103 MMOL/L (ref 98–107)
CO2 SERPL-SCNC: 20 MMOL/L (ref 22–29)
CREAT SERPL-MCNC: 1.2 MG/DL (ref 0.76–1.27)
DEPRECATED RDW RBC AUTO: 43.3 FL (ref 37–54)
EGFRCR SERPLBLD CKD-EPI 2021: 77.4 ML/MIN/1.73
EOSINOPHIL # BLD AUTO: 0.06 10*3/MM3 (ref 0–0.4)
EOSINOPHIL NFR BLD AUTO: 1.2 % (ref 0.3–6.2)
ERYTHROCYTE [DISTWIDTH] IN BLOOD BY AUTOMATED COUNT: 12.4 % (ref 12.3–15.4)
GLUCOSE SERPL-MCNC: 76 MG/DL (ref 65–99)
HCT VFR BLD AUTO: 45.2 % (ref 37.5–51)
HGB BLD-MCNC: 14.8 G/DL (ref 13–17.7)
HOLD SPECIMEN: NORMAL
LYMPHOCYTES # BLD AUTO: 2.06 10*3/MM3 (ref 0.7–3.1)
LYMPHOCYTES NFR BLD AUTO: 40.3 % (ref 19.6–45.3)
MCH RBC QN AUTO: 32.2 PG (ref 26.6–33)
MCHC RBC AUTO-ENTMCNC: 32.7 G/DL (ref 31.5–35.7)
MCV RBC AUTO: 98.5 FL (ref 79–97)
MONOCYTES # BLD AUTO: 0.58 10*3/MM3 (ref 0.1–0.9)
MONOCYTES NFR BLD AUTO: 11.4 % (ref 5–12)
NEUTROPHILS NFR BLD AUTO: 2.34 10*3/MM3 (ref 1.7–7)
NEUTROPHILS NFR BLD AUTO: 45.7 % (ref 42.7–76)
PLATELET # BLD AUTO: 249 10*3/MM3 (ref 140–450)
PMV BLD AUTO: 10.5 FL (ref 6–12)
POTASSIUM SERPL-SCNC: 4.4 MMOL/L (ref 3.5–5.2)
RBC # BLD AUTO: 4.59 10*6/MM3 (ref 4.14–5.8)
SODIUM SERPL-SCNC: 137 MMOL/L (ref 136–145)
WBC NRBC COR # BLD: 5.11 10*3/MM3 (ref 3.4–10.8)

## 2023-11-14 PROCEDURE — 85025 COMPLETE CBC W/AUTO DIFF WBC: CPT

## 2023-11-14 PROCEDURE — 80048 BASIC METABOLIC PNL TOTAL CA: CPT | Performed by: INTERNAL MEDICINE

## 2023-11-14 PROCEDURE — 36415 COLL VENOUS BLD VENIPUNCTURE: CPT

## 2023-11-16 ENCOUNTER — TELEPHONE (OUTPATIENT)
Dept: ONCOLOGY | Facility: CLINIC | Age: 43
End: 2023-11-16
Payer: COMMERCIAL

## 2023-11-16 DIAGNOSIS — R79.89 ELEVATED SERUM CREATININE: Primary | ICD-10-CM

## 2023-11-16 NOTE — TELEPHONE ENCOUNTER
Pt returned my call and I let him know that Dr. Zhao wants him to see a nephrologist and that I have sent the referral.  He v/u.

## 2023-11-16 NOTE — PROGRESS NOTES
Attempted to call pt and let him know that Dr. Zhao wants him to see a nephrologist.  Voicemail left for him to return my call.  Referral placed.

## 2024-05-22 RX ORDER — TESTOSTERONE CYPIONATE 200 MG/ML
0.6 INJECTION, SOLUTION INTRAMUSCULAR
COMMUNITY
Start: 2024-01-26

## 2024-05-22 RX ORDER — AMLODIPINE BESYLATE 5 MG/1
1 TABLET ORAL DAILY
COMMUNITY
Start: 2024-03-12 | End: 2025-03-12

## 2024-05-22 NOTE — PROGRESS NOTES
Class 2 Severe Obesity (BMI >=35 and <=39.9). Obesity-related health conditions include the following: none. Obesity is unchanged. BMI is is above average; BMI management plan is completed. We discussed portion control, increasing exercise, and Information on healthy weight added to patient's after visit summary.

## 2024-05-23 PROBLEM — I10 HYPERTENSION: Status: ACTIVE | Noted: 2024-03-12

## 2024-05-23 PROBLEM — N28.9 RENAL INSUFFICIENCY: Status: ACTIVE | Noted: 2024-03-12

## 2024-05-23 NOTE — PATIENT INSTRUCTIONS
Health Maintenance Due   Topic Date Due    ANNUAL PHYSICAL  Never done    COVID-19 Vaccine (1 - 2023-24 season) Never done    Check blood pressure cuff for accuracy and send 10 blood pressures over 2 weeks.  Watch sodium, alcohol and weight     12 hour fast for labs

## 2024-05-24 ENCOUNTER — OFFICE VISIT (OUTPATIENT)
Dept: FAMILY MEDICINE CLINIC | Facility: CLINIC | Age: 44
End: 2024-05-24
Payer: COMMERCIAL

## 2024-05-24 VITALS
BODY MASS INDEX: 38.36 KG/M2 | HEIGHT: 76 IN | DIASTOLIC BLOOD PRESSURE: 87 MMHG | OXYGEN SATURATION: 97 % | WEIGHT: 315 LBS | SYSTOLIC BLOOD PRESSURE: 147 MMHG | TEMPERATURE: 98.2 F | HEART RATE: 76 BPM

## 2024-05-24 DIAGNOSIS — M54.50 LOW BACK PAIN RADIATING TO RIGHT LEG: ICD-10-CM

## 2024-05-24 DIAGNOSIS — N28.9 RENAL INSUFFICIENCY: ICD-10-CM

## 2024-05-24 DIAGNOSIS — M79.604 LOW BACK PAIN RADIATING TO RIGHT LEG: ICD-10-CM

## 2024-05-24 DIAGNOSIS — Z00.01 ENCOUNTER FOR ANNUAL GENERAL MEDICAL EXAMINATION WITH ABNORMAL FINDINGS IN ADULT: Primary | ICD-10-CM

## 2024-05-24 DIAGNOSIS — Z13.1 SCREENING FOR DIABETES MELLITUS: ICD-10-CM

## 2024-05-24 DIAGNOSIS — I10 HYPERTENSION, UNSPECIFIED TYPE: ICD-10-CM

## 2024-05-24 DIAGNOSIS — E55.9 VITAMIN D DEFICIENCY: ICD-10-CM

## 2024-05-24 DIAGNOSIS — Z13.220 SCREENING CHOLESTEROL LEVEL: ICD-10-CM

## 2024-05-24 PROCEDURE — 99214 OFFICE O/P EST MOD 30 MIN: CPT | Performed by: PREVENTIVE MEDICINE

## 2024-05-24 PROCEDURE — 99396 PREV VISIT EST AGE 40-64: CPT | Performed by: PREVENTIVE MEDICINE

## 2024-05-24 NOTE — PROGRESS NOTES
Vivienne Mar is a 44 y.o. male presents for   Chief Complaint   Patient presents with    Establish Care    Annual Exam     Patient is not fasting.       Rash     Scheduled to see dermatology for.    Hypertension     Took medication this morning does not feel like it is doing a whole lot.  Has been on meds about 2 months       Health Maintenance Due   Topic Date Due    ANNUAL PHYSICAL  Never done    COVID-19 Vaccine (1 - 2023-24 season) Never done   Patient presents today for his annual wellness exam and to establish care.  He was advised to wear sunscreen and a seatbelt.    Rash  Pertinent negatives include no cough or sore throat.   Hypertension       History of Present Illness  The patient is a 44-year-old male who is here today to establish care for his annual wellness exam, hypertension, body mass index of 39, renal insufficiency, low back pain radiating to the right leg, screening cholesterol level, screening for diabetes, and vitamin D deficiency.    The patient has been under the care of Dr. Vasquez in [place] for renal insufficiency. Two years prior, he observed a yellow coloration in his urine, prompting a diagnosis of renal insufficiency in [place]. Despite being on antihypertensive medication, there has been no improvement in his condition. A follow-up appointment is scheduled for 08/2024. He owns a home blood pressure monitor.    The patient reports no alterations in his hearing or vision since his last primary care provider's visit. He acknowledges the need to establish care with a dentist and an ophthalmologist. He denies hemoptysis, headache, or wheezing. He infrequently experiences heart palpitations, attributing this to daily caffeine consumption. He denies dysphagia, indigestion, or bowel irregularities. He denies dysuria or hematuria. He occasionally experiences erectile dysfunction and is under medical supervision. He is currently on testosterone therapy. He denies melena or  hematochezia, persistent diarrhea, or constipation. He denies systemic symptoms such as fever, chills, or night sweats. His weight has remained stable. He practices safety measures such as wearing a seatbelt and abstains from exercise. He has no history of hospitalization or blood transfusion. 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 He donates blood due to polycythemia. His previous care was managed by Dr. Hailey Artis at [facility] in [place]. He lacks medical records. His cholesterol levels were elevated during a cholesterol screening two years ago.    The patient reports occasional knee popping and a sensation akin to jabbing with a knife. This has been ongoing for approximately a year. He denies any known knee injury or swelling. The pain has been manageable recently, but when it does occur, it intensifies. He requests a referral to an orthopedic specialist. Standing provides relief for his back pain. He receives cortisone injections for his bulging herniated discs in his lower back when the pain becomes severe. He has not experienced this pain in the past year.    The patient recently developed hives on his hands, which have been peeling. He is uncertain if the hives are due to contact with the horses. He has a dermatology appointment scheduled for the coming weeks. He has been applying Working hand cream to his fingers.   The patient denies smoking or vaping. He drinks alcohol on weekend.   His father has diabetes, head trauma, and seizures. His mother is 70 and is in good health. He has 2 brothers, but unsure of their health status. He has no sisters. His paternal grandmother had breast cancer in her 80s.   The patient has no known drug allergies.   He is up-to-date on his vaccines.    Vitals:    05/24/24 1133 05/24/24 1134   BP: 146/82 147/87   BP Location: Left arm Right arm   Patient Position: Sitting Sitting   Cuff Size: Adult Adult   Pulse: 76 76   Temp: 98.2 °F (36.8 °C)  "   TempSrc: Temporal    SpO2: 97%    Weight: (!) 149 kg (328 lb)    Height: 193 cm (76\")      Body mass index is 39.93 kg/m².    Current Outpatient Medications on File Prior to Visit   Medication Sig Dispense Refill    amLODIPine (NORVASC) 5 MG tablet Take 1 tablet by mouth Daily.      Testosterone Cypionate (DEPOTESTOTERONE CYPIONATE) 200 MG/ML injection Inject 0.6 mL into the appropriate muscle as directed by prescriber.      [DISCONTINUED] mupirocin (BACTROBAN) 2 % ointment Apply 1 application  topically to the appropriate area as directed 3 (Three) Times a Day. (Patient not taking: Reported on 11/14/2023) 30 g 0     No current facility-administered medications on file prior to visit.       The following portions of the patient's history were reviewed and updated as appropriate: allergies, current medications, past family history, past medical history, past social history, past surgical history, and problem list.    Review of Systems   Constitutional: Negative.    HENT: Negative.  Negative for sinus pressure and sore throat.    Eyes: Negative.    Respiratory: Negative.  Negative for cough.    Cardiovascular: Negative.    Gastrointestinal: Negative.    Endocrine: Negative.    Genitourinary: Negative.    Musculoskeletal:  Positive for back pain, gait problem, joint swelling and myalgias.   Skin:  Positive for rash.   Allergic/Immunologic: Positive for environmental allergies.   Hematological: Negative.    Psychiatric/Behavioral: Negative.         Objective   Physical Exam  Vitals reviewed.   Constitutional:       General: He is not in acute distress.     Appearance: He is well-developed. He is obese. He is not ill-appearing or toxic-appearing.   HENT:      Head: Normocephalic and atraumatic.      Right Ear: Tympanic membrane, ear canal and external ear normal.      Left Ear: Tympanic membrane, ear canal and external ear normal.      Nose: Nose normal.      Mouth/Throat:      Mouth: Mucous membranes are moist.      " Pharynx: No posterior oropharyngeal erythema.   Eyes:      Extraocular Movements: Extraocular movements intact.      Conjunctiva/sclera: Conjunctivae normal.      Pupils: Pupils are equal, round, and reactive to light.   Cardiovascular:      Rate and Rhythm: Normal rate and regular rhythm.      Heart sounds: Normal heart sounds.   Pulmonary:      Effort: Pulmonary effort is normal.      Breath sounds: Normal breath sounds.   Abdominal:      General: Bowel sounds are normal. There is no distension.      Palpations: Abdomen is soft. There is no mass.      Tenderness: There is no abdominal tenderness. There is no right CVA tenderness or left CVA tenderness.   Musculoskeletal:         General: No swelling, tenderness, deformity or signs of injury. Normal range of motion.      Cervical back: Neck supple.      Right lower leg: No edema.      Left lower leg: No edema.   Skin:     General: Skin is warm.   Neurological:      General: No focal deficit present.      Mental Status: He is alert and oriented to person, place, and time.   Psychiatric:         Mood and Affect: Mood normal.         Behavior: Behavior normal.       Physical Exam      PHQ-9 Total Score: 0  Results           Assessment & Plan   Diagnoses and all orders for this visit:    1. Encounter for annual general medical examination with abnormal findings in adult (Primary)  -     CBC w AUTO Differential; Future    2. Hypertension, unspecified type  -     Comprehensive metabolic panel; Future    3. Body mass index (BMI) of 39.0 to 39.9 in adult  Assessment & Plan:  Class 2 Severe Obesity (BMI >=35 and <=39.9). Obesity-related health conditions include the following: hypertension. Obesity is unchanged. BMI is is above average; BMI management plan is completed. We discussed portion control and increasing exercise.       4. Renal insufficiency    5. Low back pain radiating to right leg    6. Screening cholesterol level  -     Lipid panel; Future    7. Screening for  diabetes mellitus    8. Vitamin D deficiency  -     Vitamin D 25 hydroxy; Future      Assessment & Plan  1. Annual wellness examination.  The patient's blood pressure readings today were slightly elevated, registering at 146/82 and 147/87. The patient is advised to monitor his portion sizes and maintain regular physical activity. A 12-hour fasting period is planned, with fasting labs to be conducted within the next 2 weeks. He is also instructed to bring his blood pressure readings to the appointment. A discussion with Dr. Vasquez regarding potential medication adjustments will be held.    2. Knee pain.  A referral to an orthopedic specialist will be made.    3. Urticaria.  The patient exhibits blisters on his hands, suggesting a possible allergic reaction to hay. The patient is advised to use over-the-counter hydrocortisone, to be applied 4 times daily.    4. Polycythemia.  Testosterone levels will be assessed.    5. Low back pain.  The patient was informed that weight loss could alleviate his back pain. The patient is advised to wear insoles and change them every 6 weeks.    Patient Instructions     Health Maintenance Due   Topic Date Due    ANNUAL PHYSICAL  Never done    COVID-19 Vaccine (1 - 2023-24 season) Never done    Check blood pressure cuff for accuracy and send 10 blood pressures over 2 weeks.  Watch sodium, alcohol and weight     12 hour fast for labs       Patient or patient representative verbalized consent for the use of Ambient Listening during the visit with  Mary Ann Garza MD for chart documentation. 5/24/2024  18:17 EDT

## 2024-05-24 NOTE — ASSESSMENT & PLAN NOTE
Class 2 Severe Obesity (BMI >=35 and <=39.9). Obesity-related health conditions include the following: hypertension. Obesity is unchanged. BMI is is above average; BMI management plan is completed. We discussed portion control and increasing exercise.

## 2024-05-28 ENCOUNTER — LAB (OUTPATIENT)
Dept: FAMILY MEDICINE CLINIC | Facility: CLINIC | Age: 44
End: 2024-05-28
Payer: COMMERCIAL

## 2024-05-28 DIAGNOSIS — Z13.220 SCREENING CHOLESTEROL LEVEL: ICD-10-CM

## 2024-05-28 DIAGNOSIS — Z00.01 ENCOUNTER FOR ANNUAL GENERAL MEDICAL EXAMINATION WITH ABNORMAL FINDINGS IN ADULT: ICD-10-CM

## 2024-05-28 DIAGNOSIS — E55.9 VITAMIN D DEFICIENCY: ICD-10-CM

## 2024-05-28 DIAGNOSIS — I10 HYPERTENSION, UNSPECIFIED TYPE: ICD-10-CM

## 2024-05-28 PROCEDURE — 80061 LIPID PANEL: CPT | Performed by: PREVENTIVE MEDICINE

## 2024-05-28 PROCEDURE — 36415 COLL VENOUS BLD VENIPUNCTURE: CPT

## 2024-05-28 PROCEDURE — 80053 COMPREHEN METABOLIC PANEL: CPT | Performed by: PREVENTIVE MEDICINE

## 2024-05-28 PROCEDURE — 82306 VITAMIN D 25 HYDROXY: CPT | Performed by: PREVENTIVE MEDICINE

## 2024-05-28 PROCEDURE — 85025 COMPLETE CBC W/AUTO DIFF WBC: CPT | Performed by: PREVENTIVE MEDICINE

## 2024-05-29 ENCOUNTER — TELEPHONE (OUTPATIENT)
Dept: FAMILY MEDICINE CLINIC | Facility: CLINIC | Age: 44
End: 2024-05-29
Payer: COMMERCIAL

## 2024-05-29 LAB
25(OH)D3 SERPL-MCNC: 27.7 NG/ML (ref 30–100)
ALBUMIN SERPL-MCNC: 4.6 G/DL (ref 3.5–5.2)
ALBUMIN/GLOB SERPL: 1.7 G/DL
ALP SERPL-CCNC: 50 U/L (ref 39–117)
ALT SERPL W P-5'-P-CCNC: 41 U/L (ref 1–41)
ANION GAP SERPL CALCULATED.3IONS-SCNC: 13 MMOL/L (ref 5–15)
AST SERPL-CCNC: 29 U/L (ref 1–40)
BASOPHILS # BLD AUTO: 0.06 10*3/MM3 (ref 0–0.2)
BASOPHILS NFR BLD AUTO: 1.7 % (ref 0–1.5)
BILIRUB SERPL-MCNC: 0.3 MG/DL (ref 0–1.2)
BUN SERPL-MCNC: 15 MG/DL (ref 6–20)
BUN/CREAT SERPL: 10.6 (ref 7–25)
CALCIUM SPEC-SCNC: 9.3 MG/DL (ref 8.6–10.5)
CHLORIDE SERPL-SCNC: 98 MMOL/L (ref 98–107)
CHOLEST SERPL-MCNC: 152 MG/DL (ref 0–200)
CO2 SERPL-SCNC: 25 MMOL/L (ref 22–29)
CREAT SERPL-MCNC: 1.42 MG/DL (ref 0.76–1.27)
DEPRECATED RDW RBC AUTO: 43.3 FL (ref 37–54)
EGFRCR SERPLBLD CKD-EPI 2021: 62.9 ML/MIN/1.73
EOSINOPHIL # BLD AUTO: 0.09 10*3/MM3 (ref 0–0.4)
EOSINOPHIL NFR BLD AUTO: 2.5 % (ref 0.3–6.2)
ERYTHROCYTE [DISTWIDTH] IN BLOOD BY AUTOMATED COUNT: 12.4 % (ref 12.3–15.4)
GLOBULIN UR ELPH-MCNC: 2.7 GM/DL
GLUCOSE SERPL-MCNC: 84 MG/DL (ref 65–99)
HCT VFR BLD AUTO: 49.5 % (ref 37.5–51)
HDLC SERPL-MCNC: 39 MG/DL (ref 40–60)
HGB BLD-MCNC: 16.9 G/DL (ref 13–17.7)
IMM GRANULOCYTES # BLD AUTO: 0 10*3/MM3 (ref 0–0.05)
IMM GRANULOCYTES NFR BLD AUTO: 0 % (ref 0–0.5)
LDLC SERPL CALC-MCNC: 90 MG/DL (ref 0–100)
LDLC/HDLC SERPL: 2.23 {RATIO}
LYMPHOCYTES # BLD AUTO: 1.69 10*3/MM3 (ref 0.7–3.1)
LYMPHOCYTES NFR BLD AUTO: 46.8 % (ref 19.6–45.3)
MCH RBC QN AUTO: 32.4 PG (ref 26.6–33)
MCHC RBC AUTO-ENTMCNC: 34.1 G/DL (ref 31.5–35.7)
MCV RBC AUTO: 95 FL (ref 79–97)
MONOCYTES # BLD AUTO: 0.4 10*3/MM3 (ref 0.1–0.9)
MONOCYTES NFR BLD AUTO: 11.1 % (ref 5–12)
NEUTROPHILS NFR BLD AUTO: 1.37 10*3/MM3 (ref 1.7–7)
NEUTROPHILS NFR BLD AUTO: 37.9 % (ref 42.7–76)
NRBC BLD AUTO-RTO: 0 /100 WBC (ref 0–0.2)
PLATELET # BLD AUTO: 300 10*3/MM3 (ref 140–450)
PMV BLD AUTO: 10 FL (ref 6–12)
POTASSIUM SERPL-SCNC: 4.5 MMOL/L (ref 3.5–5.2)
PROT SERPL-MCNC: 7.3 G/DL (ref 6–8.5)
RBC # BLD AUTO: 5.21 10*6/MM3 (ref 4.14–5.8)
SODIUM SERPL-SCNC: 136 MMOL/L (ref 136–145)
TRIGL SERPL-MCNC: 131 MG/DL (ref 0–150)
VLDLC SERPL-MCNC: 23 MG/DL (ref 5–40)
WBC NRBC COR # BLD AUTO: 3.61 10*3/MM3 (ref 3.4–10.8)

## 2024-05-29 NOTE — TELEPHONE ENCOUNTER
"Relay     \"Kidney function has decreased from 77-63 so make sure you keep your follow-up with a nephrologist.  If you have not established with one here we will be glad to put in referral please let us know.     Vitamin D is also slightly low so increase to 1000 units daily over-the-counter call if any other questions or concerns and let us know about the above\"                "

## 2024-05-29 NOTE — PROGRESS NOTES
Kidney function has decreased from 77-63 so make sure you keep your follow-up with a nephrologist.  If you have not established with one here we will be glad to put in referral please let us know.    Vitamin D is also slightly low so increase to 1000 units daily over-the-counter call if any other questions or concerns and let us know about the above

## 2024-06-06 ENCOUNTER — PATIENT ROUNDING (BHMG ONLY) (OUTPATIENT)
Dept: FAMILY MEDICINE CLINIC | Facility: CLINIC | Age: 44
End: 2024-06-06
Payer: COMMERCIAL

## 2024-06-06 NOTE — PROGRESS NOTES
A [employer] message has been sent to the patient for patient rounding with [facility]

## 2025-01-19 PROBLEM — E55.9 VITAMIN D DEFICIENCY: Status: ACTIVE | Noted: 2025-01-19

## 2025-01-19 PROBLEM — N18.2 STAGE 2 CHRONIC KIDNEY DISEASE: Status: ACTIVE | Noted: 2024-03-12

## 2025-01-19 NOTE — PATIENT INSTRUCTIONS
Health Maintenance Due   Topic Date Due    INFLUENZA VACCINE  07/01/2024    COVID-19 Vaccine (1 - 2024-25 season) Never done    Check blood pressure cuff for accuracy and send 10 blood pressures over 2 weeks.  Watch sodium, alcohol and weight     12 hour fast for labs

## 2025-01-20 ENCOUNTER — HOSPITAL ENCOUNTER (OUTPATIENT)
Dept: GENERAL RADIOLOGY | Facility: HOSPITAL | Age: 45
Discharge: HOME | End: 2025-01-20
Payer: COMMERCIAL

## 2025-01-20 ENCOUNTER — OFFICE VISIT (OUTPATIENT)
Dept: FAMILY MEDICINE CLINIC | Facility: CLINIC | Age: 45
End: 2025-01-20
Payer: COMMERCIAL

## 2025-01-20 VITALS
BODY MASS INDEX: 38.36 KG/M2 | WEIGHT: 315 LBS | HEART RATE: 79 BPM | HEIGHT: 76 IN | TEMPERATURE: 97.8 F | OXYGEN SATURATION: 97 % | DIASTOLIC BLOOD PRESSURE: 90 MMHG | SYSTOLIC BLOOD PRESSURE: 155 MMHG

## 2025-01-20 DIAGNOSIS — E66.813 CLASS 3 SEVERE OBESITY DUE TO EXCESS CALORIES WITH SERIOUS COMORBIDITY AND BODY MASS INDEX (BMI) OF 40.0 TO 44.9 IN ADULT: ICD-10-CM

## 2025-01-20 DIAGNOSIS — N18.2 STAGE 2 CHRONIC KIDNEY DISEASE: ICD-10-CM

## 2025-01-20 DIAGNOSIS — M25.519 SHOULDER PAIN, UNSPECIFIED CHRONICITY, UNSPECIFIED LATERALITY: Primary | ICD-10-CM

## 2025-01-20 DIAGNOSIS — E55.9 VITAMIN D DEFICIENCY: ICD-10-CM

## 2025-01-20 DIAGNOSIS — Z23 NEED FOR COVID-19 VACCINE: ICD-10-CM

## 2025-01-20 DIAGNOSIS — M25.519 SHOULDER PAIN, UNSPECIFIED CHRONICITY, UNSPECIFIED LATERALITY: ICD-10-CM

## 2025-01-20 DIAGNOSIS — Z23 NEED FOR INFLUENZA VACCINATION: ICD-10-CM

## 2025-01-20 DIAGNOSIS — E66.01 CLASS 3 SEVERE OBESITY DUE TO EXCESS CALORIES WITH SERIOUS COMORBIDITY AND BODY MASS INDEX (BMI) OF 40.0 TO 44.9 IN ADULT: ICD-10-CM

## 2025-01-20 DIAGNOSIS — I10 HYPERTENSION, UNSPECIFIED TYPE: ICD-10-CM

## 2025-01-20 PROCEDURE — 99214 OFFICE O/P EST MOD 30 MIN: CPT | Performed by: PREVENTIVE MEDICINE

## 2025-01-20 PROCEDURE — 90480 ADMN SARSCOV2 VAC 1/ONLY CMP: CPT | Performed by: PREVENTIVE MEDICINE

## 2025-01-20 PROCEDURE — 72050 X-RAY EXAM NECK SPINE 4/5VWS: CPT

## 2025-01-20 PROCEDURE — 91320 SARSCV2 VAC 30MCG TRS-SUC IM: CPT | Performed by: PREVENTIVE MEDICINE

## 2025-01-20 PROCEDURE — 73030 X-RAY EXAM OF SHOULDER: CPT

## 2025-01-20 PROCEDURE — 90656 IIV3 VACC NO PRSV 0.5 ML IM: CPT | Performed by: PREVENTIVE MEDICINE

## 2025-01-20 PROCEDURE — 90471 IMMUNIZATION ADMIN: CPT | Performed by: PREVENTIVE MEDICINE

## 2025-01-20 RX ORDER — SULINDAC 200 MG/1
200 TABLET ORAL 2 TIMES DAILY
Qty: 20 TABLET | Refills: 1 | Status: SHIPPED | OUTPATIENT
Start: 2025-01-20

## 2025-01-20 NOTE — ASSESSMENT & PLAN NOTE
Patient's (Body mass index is 41.19 kg/m².) indicates that they are morbidly/severely obese (BMI > 40 or > 35 with obesity - related health condition) with health conditions that include hypertension . Weight is worsening. BMI  is above average; BMI management plan is completed. We discussed portion control and increasing exercise.

## 2025-01-20 NOTE — PROGRESS NOTES
Subjective   Roberto Carlos Mar is a 44 y.o. male presents for   Chief Complaint   Patient presents with    Shoulder Pain     Left shoulder unknown injury ongoing for a month    Hypertension     Has not taken Meds today.  Does not monitor closely       There are no preventive care reminders to display for this patient.    Hypertension  Associated symptoms include neck pain. Pertinent negatives include no chest pain or palpitations.      History of Present Illness  The patient is a 44-year-old male who is here today to follow up on left shoulder pain, hypertension, vitamin D deficiency, stage 2 chronic kidney disease, need for influenza and COVID-19 vaccines, and class 3 severe obesity due to excess calories with serious comorbidities and a body mass index of 40.    He has been experiencing persistent left shoulder pain for approximately 1 month, which he first noticed upon waking one morning. Initially, the pain was characterized by tingling sensations radiating down his arm, but it has since evolved into an ache localized in his elbow. The pain is most severe at night, often disrupting his sleep, and intensifies after completing his evening chores. Morning stiffness is also reported. He reports no previous history of shoulder issues and is right-hand dominant. He speculates that the pain may be due to sleeping in an awkward position. Over-the-counter analgesics such as Tylenol or Advil have provided some relief, but have not completely alleviated the pain. He reports no numbness or tingling in his fingers, but does experience tingling in his forearm and aching in his elbow. 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    MEDICATIONS  Current: Tylenol, Advil    Vitals:    01/20/25 1319 01/20/25 1321   BP: 155/98 155/90   BP Location: Left arm Right arm   Patient Position: Sitting Sitting   Cuff Size: Adult Adult   Pulse: 79 79   Temp: 97.8 °F (36.6 °C)    TempSrc: Temporal   "  SpO2: 97%    Weight: (!) 153 kg (338 lb 6.4 oz)    Height: 193 cm (76\")      Body mass index is 41.19 kg/m².    Current Outpatient Medications on File Prior to Visit   Medication Sig Dispense Refill    amLODIPine (NORVASC) 5 MG tablet Take 1 tablet by mouth Daily.      Testosterone Cypionate (DEPOTESTOTERONE CYPIONATE) 200 MG/ML injection Inject 0.6 mL into the appropriate muscle as directed by prescriber.       No current facility-administered medications on file prior to visit.       The following portions of the patient's history were reviewed and updated as appropriate: allergies, current medications, past family history, past medical history, past social history, past surgical history, and problem list.    Review of Systems   Cardiovascular:  Negative for chest pain and palpitations.   Musculoskeletal:  Positive for arthralgias, myalgias and neck pain.       Objective   Physical Exam  Vitals reviewed.   Constitutional:       General: He is not in acute distress.     Appearance: He is well-developed. He is obese. He is not ill-appearing or toxic-appearing.   HENT:      Head: Normocephalic and atraumatic.      Left Ear: Tympanic membrane normal.      Nose: Nose normal.   Eyes:      Extraocular Movements: Extraocular movements intact.      Conjunctiva/sclera: Conjunctivae normal.      Pupils: Pupils are equal, round, and reactive to light.   Cardiovascular:      Rate and Rhythm: Normal rate and regular rhythm.      Heart sounds: Normal heart sounds.   Pulmonary:      Effort: Pulmonary effort is normal.      Breath sounds: Normal breath sounds.   Abdominal:      General: Bowel sounds are normal. There is no distension.      Palpations: Abdomen is soft. There is no mass.      Tenderness: There is no abdominal tenderness.   Musculoskeletal:      Cervical back: Neck supple.      Comments: Pain into the left shoulder with full forward flexion right lateral flexion and left rotation.  Spurling's maneuver was weakly " positive on the left.   hand sense biceps triceps brachial radialis reflexes were normal.  Full range of motion today of the right shoulder without discomfort and good strength of the left equal to right shoulder.   Skin:     General: Skin is warm.   Neurological:      General: No focal deficit present.      Mental Status: He is alert and oriented to person, place, and time.   Psychiatric:         Mood and Affect: Mood normal.         Behavior: Behavior normal.       Physical Exam      PHQ-9 Total Score:    Results           Assessment & Plan   Diagnoses and all orders for this visit:    1. Shoulder pain, unspecified chronicity, unspecified laterality (Primary)  -     XR Spine Cervical Complete 4 or 5 View  -     XR Shoulder 2+ View Left; Future    2. Need for COVID-19 vaccine  -     COVID-19 (Pfizer) 12yrs+ (COMIRNATY)    3. Hypertension, unspecified type  -     CBC Auto Differential; Future  -     Comprehensive Metabolic Panel; Future    4. Vitamin D deficiency  -     Vitamin D,25-Hydroxy; Future    5. Stage 2 chronic kidney disease    6. Need for influenza vaccination  -     Fluzone >6mos ([id number])    7. Class 3 severe obesity due to excess calories with serious comorbidity and body mass index (BMI) of 40.0 to 44.9 in adult  Assessment & Plan:  Patient's (Body mass index is 41.19 kg/m².) indicates that they are morbidly/severely obese (BMI > 40 or > 35 with obesity - related health condition) with health conditions that include hypertension . Weight is worsening. BMI  is above average; BMI management plan is completed. We discussed portion control and increasing exercise.       Other orders  -     sulindac (CLINORIL) 200 MG tablet; Take 1 tablet by mouth 2 (Two) Times a Day.  Dispense: 20 tablet; Refill: 1      Assessment & Plan  1. Left shoulder pain.  The etiology of the pain appears to be more cervical in nature rather than originating from the shoulder. He has been advised to engage in home exercises  for neck pain relief and shoulder mobility, specifically [place] and wall walk exercises. He has been instructed to maintain good posture while working at the computer, ensuring the screen is at eye level and his arms are well supported. The use of a rolled-up face towel in his pillow slip has been suggested for additional neck support during sleep. He may apply ice or heat to the affected area as needed. Radiographic imaging of the cervical spine and left shoulder will be obtained. A prescription for sulindac, to be taken twice daily with meals, has been provided. He has been advised to take the medication only when experiencing pain. A comprehensive blood panel, including vitamin D, kidney, and liver function tests, will be conducted in the coming weeks.    Follow-up  The patient is scheduled for a follow-up visit in 6 weeks, or earlier if necessary.    Patient Instructions     Health Maintenance Due   Topic Date Due    INFLUENZA VACCINE  07/01/2024    COVID-19 Vaccine (1 - 2024-25 season) Never done    Check blood pressure cuff for accuracy and send 10 blood pressures over 2 weeks.  Watch sodium, alcohol and weight     12 hour fast for labs           Patient or patient representative verbalized consent for the use of Ambient Listening during the visit with  Mary Ann Garza MD for chart documentation. 1/20/2025  17:58 EST

## 2025-01-21 NOTE — PROGRESS NOTES
Left shoulder x-ray looked fairly normal.  The cervical spine showed moderate cervical degenerative disease without evidence of fracture or malalignment.  Follow-up as planned and we will further image if symptoms persist

## 2025-01-24 ENCOUNTER — OFFICE VISIT (OUTPATIENT)
Dept: FAMILY MEDICINE CLINIC | Facility: CLINIC | Age: 45
End: 2025-01-24
Payer: COMMERCIAL

## 2025-01-24 VITALS
WEIGHT: 315 LBS | HEART RATE: 87 BPM | HEIGHT: 76 IN | DIASTOLIC BLOOD PRESSURE: 102 MMHG | SYSTOLIC BLOOD PRESSURE: 167 MMHG | BODY MASS INDEX: 38.36 KG/M2 | TEMPERATURE: 97.4 F

## 2025-01-24 DIAGNOSIS — E66.01 CLASS 3 SEVERE OBESITY DUE TO EXCESS CALORIES WITH SERIOUS COMORBIDITY AND BODY MASS INDEX (BMI) OF 40.0 TO 44.9 IN ADULT: ICD-10-CM

## 2025-01-24 DIAGNOSIS — M47.22 CERVICAL SPONDYLOSIS WITH RADICULOPATHY: Primary | ICD-10-CM

## 2025-01-24 DIAGNOSIS — M25.512 ACUTE PAIN OF LEFT SHOULDER: ICD-10-CM

## 2025-01-24 DIAGNOSIS — E66.813 CLASS 3 SEVERE OBESITY DUE TO EXCESS CALORIES WITH SERIOUS COMORBIDITY AND BODY MASS INDEX (BMI) OF 40.0 TO 44.9 IN ADULT: ICD-10-CM

## 2025-01-24 PROCEDURE — 99214 OFFICE O/P EST MOD 30 MIN: CPT | Performed by: PREVENTIVE MEDICINE

## 2025-01-24 RX ORDER — TRAMADOL HYDROCHLORIDE 50 MG/1
TABLET ORAL
Qty: 14 TABLET | Refills: 0 | Status: SHIPPED | OUTPATIENT
Start: 2025-01-24

## 2025-01-24 RX ORDER — CYCLOBENZAPRINE HCL 5 MG
TABLET ORAL
Qty: 14 TABLET | Refills: 0 | Status: SHIPPED | OUTPATIENT
Start: 2025-01-24

## 2025-01-24 NOTE — PATIENT INSTRUCTIONS
There are no preventive care reminders to display for this patient.     Check blood pressure cuff for accuracy and send 10 blood pressures over 2 weeks.  Watch sodium, alcohol and weight

## 2025-01-24 NOTE — PROGRESS NOTES
Subjective   Roberto Carlos Mar is a 44 y.o. male presents for   Chief Complaint   Patient presents with    Shoulder Pain     Left side.  Pain is worsening since last visit and the imaging was completed. Patient feels it is a pinched nerve has no known injury that he is aware of.    Hypertension       There are no preventive care reminders to display for this patient.    Hypertension  Associated symptoms include neck pain.      History of Present Illness  The patient presents for evaluation of back pain.    He has been engaging in neck pain relief exercises, both those prescribed and additional ones he found online. He can identify specific areas of discomfort when rotating his head. His condition deteriorated following an x-ray examination, with persistent pain that intensified the subsequent night, causing him to awaken at 3 AM due to severe shoulder pain. The pain was so intense that it brought him to tears and led him to take one of his wife's tramadol tablets. His wife has also been administering her muscle relaxant, baclofen, to help manage his pain, which has allowed him to sleep for approximately 3 to 4 hours on several occasions. He has discovered that using a neck stretching pillow provides significant relief. Currently, he experiences a burning sensation in his forearm and numbness in his second, third, and fourth fingers, which has persisted since the previous night. His thumb and little finger are not affected. He reports that his back pain was manageable upon waking today, but he continues to experience numbness. 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 He believes that maintaining activity slightly alleviates his symptoms. He reports no numbness or tingling in his legs, and no issues with bowel or bladder control. He has been taking sulindac during the day, but is uncertain of its efficacy. Last night, he took baclofen,  "Tylenol, and another medication prescribed by his doctor, which seemed to provide some relief.    MEDICATIONS  Current: Baclofen, sulindac, Tylenol.    Vitals:    01/24/25 1533   BP: (!) 167/102   BP Location: Right arm   Patient Position: Sitting   Cuff Size: Adult   Pulse: 87   Temp: 97.4 °F (36.3 °C)   TempSrc: Temporal   Weight: (!) 154 kg (338 lb 9.6 oz)   Height: 193 cm (76\")     Body mass index is 41.22 kg/m².    Current Outpatient Medications on File Prior to Visit   Medication Sig Dispense Refill    amLODIPine (NORVASC) 5 MG tablet Take 1 tablet by mouth Daily.      sulindac (CLINORIL) 200 MG tablet Take 1 tablet by mouth 2 (Two) Times a Day. 20 tablet 1    Testosterone Cypionate (DEPOTESTOTERONE CYPIONATE) 200 MG/ML injection Inject 0.6 mL into the appropriate muscle as directed by prescriber.       No current facility-administered medications on file prior to visit.       The following portions of the patient's history were reviewed and updated as appropriate: allergies, current medications, past family history, past medical history, past social history, past surgical history, and problem list.    Review of Systems   Musculoskeletal:  Positive for arthralgias, myalgias, neck pain and neck stiffness.   Neurological:  Positive for numbness.       Objective   Physical Exam  Vitals reviewed.   Constitutional:       General: He is not in acute distress.     Appearance: Normal appearance. He is well-developed. He is not ill-appearing or toxic-appearing.   HENT:      Head: Normocephalic and atraumatic.      Nose: Nose normal.   Eyes:      Extraocular Movements: Extraocular movements intact.      Conjunctiva/sclera: Conjunctivae normal.      Pupils: Pupils are equal, round, and reactive to light.   Cardiovascular:      Rate and Rhythm: Normal rate.   Pulmonary:      Effort: Pulmonary effort is normal.   Abdominal:      General: There is no distension.      Palpations: There is no mass.      Tenderness: There is " no abdominal tenderness.   Musculoskeletal:         General: Tenderness present.      Cervical back: Rigidity and tenderness present.   Neurological:      Mental Status: He is alert and oriented to person, place, and time.      Sensory: Sensory deficit present.      Motor: No weakness.   Psychiatric:         Mood and Affect: Mood normal.         Behavior: Behavior normal.       Physical Exam      PHQ-9 Total Score:    Results           Assessment & Plan   Diagnoses and all orders for this visit:    1. Cervical spondylosis with radiculopathy (Primary)  -     Ambulatory Referral to Physical Therapy for Evaluation & Treatment    2. Acute pain of left shoulder  -     Ambulatory Referral to Physical Therapy for Evaluation & Treatment    3. Class 3 severe obesity due to excess calories with serious comorbidity and body mass index (BMI) of 40.0 to 44.9 in adult    Other orders  -     cyclobenzaprine (FLEXERIL) 5 MG tablet; One tablet by mouth for severe arm pain  Dispense: 14 tablet; Refill: 0  -     traMADol (ULTRAM) 50 MG tablet; One tablet by mouth for severe arm/neck pain  Dispense: 14 tablet; Refill: 0      Assessment & Plan  1. Cervicalgia.  The symptoms, including numbness in the second, third, and fourth fingers and a burning sensation in the forearm, are likely originating from the cervical spine rather than the shoulder. A referral for physical therapy at [facility] has been initiated, as traction may provide relief. A prescription for cyclobenzaprine 5 mg has been issued, to be taken at night. He is advised to continue using Tylenol and only resort to tramadol if necessary. The use of a neck stretching pillow at night is recommended. If there is no improvement in symptoms by next Wednesday, he should inform the office so that an alternative physical therapy provider can be arranged. In the event of complete numbness or weakness in the arm, immediate medical attention at the emergency room is advised.    Patient  Instructions   There are no preventive care reminders to display for this patient.     Check blood pressure cuff for accuracy and send 10 blood pressures over 2 weeks.  Watch sodium, alcohol and weight        Patient or patient representative verbalized consent for the use of Ambient Listening during the visit with  Mary Ann Garza MD for chart documentation. 1/24/2025  17:05 EST

## 2025-01-28 ENCOUNTER — TELEPHONE (OUTPATIENT)
Dept: FAMILY MEDICINE CLINIC | Facility: CLINIC | Age: 45
End: 2025-01-28

## 2025-01-28 ENCOUNTER — TELEPHONE (OUTPATIENT)
Dept: FAMILY MEDICINE CLINIC | Facility: CLINIC | Age: 45
End: 2025-01-28
Payer: COMMERCIAL

## 2025-01-28 DIAGNOSIS — M25.512 ACUTE PAIN OF LEFT SHOULDER: Primary | ICD-10-CM

## 2025-01-28 NOTE — TELEPHONE ENCOUNTER
Caller: Roberto Carlos Mar    Relationship to patient: Self    Best call back number: [phone]    Patient is needing:   ASKING FOR THE PT REFERRAL THAT WAS SENT TO [facility] TO BE CHANGED TO GO TO [facility] IN [place].     PLEASE CALL TO CONFIRM WITH PATIENT.

## 2025-01-28 NOTE — TELEPHONE ENCOUNTER
[facility] called stating patient said they were to have a referral for PT- I didn't see anything when imaging was done stating.    Entered order

## 2025-02-18 ENCOUNTER — LAB (OUTPATIENT)
Dept: FAMILY MEDICINE CLINIC | Facility: CLINIC | Age: 45
End: 2025-02-18
Payer: COMMERCIAL

## 2025-02-18 DIAGNOSIS — I10 HYPERTENSION, UNSPECIFIED TYPE: ICD-10-CM

## 2025-02-18 DIAGNOSIS — E55.9 VITAMIN D DEFICIENCY: ICD-10-CM

## 2025-02-18 LAB
25(OH)D3 SERPL-MCNC: 21.7 NG/ML (ref 30–100)
ALBUMIN SERPL-MCNC: 4.3 G/DL (ref 3.5–5.2)
ALBUMIN/GLOB SERPL: 1.4 G/DL
ALP SERPL-CCNC: 52 U/L (ref 39–117)
ALT SERPL W P-5'-P-CCNC: 43 U/L (ref 1–41)
ANION GAP SERPL CALCULATED.3IONS-SCNC: 9.9 MMOL/L (ref 5–15)
AST SERPL-CCNC: 30 U/L (ref 1–40)
BASOPHILS # BLD AUTO: 0.08 10*3/MM3 (ref 0–0.2)
BASOPHILS NFR BLD AUTO: 1.7 % (ref 0–1.5)
BILIRUB SERPL-MCNC: 0.5 MG/DL (ref 0–1.2)
BUN SERPL-MCNC: 13 MG/DL (ref 6–20)
BUN/CREAT SERPL: 12 (ref 7–25)
CALCIUM SPEC-SCNC: 9.1 MG/DL (ref 8.6–10.5)
CHLORIDE SERPL-SCNC: 101 MMOL/L (ref 98–107)
CO2 SERPL-SCNC: 26.1 MMOL/L (ref 22–29)
CREAT SERPL-MCNC: 1.08 MG/DL (ref 0.76–1.27)
DEPRECATED RDW RBC AUTO: 45.6 FL (ref 37–54)
EGFRCR SERPLBLD CKD-EPI 2021: 87.3 ML/MIN/1.73
EOSINOPHIL # BLD AUTO: 0.1 10*3/MM3 (ref 0–0.4)
EOSINOPHIL NFR BLD AUTO: 2.2 % (ref 0.3–6.2)
ERYTHROCYTE [DISTWIDTH] IN BLOOD BY AUTOMATED COUNT: 12.9 % (ref 12.3–15.4)
GLOBULIN UR ELPH-MCNC: 3 GM/DL
GLUCOSE SERPL-MCNC: 93 MG/DL (ref 65–99)
HCT VFR BLD AUTO: 48.5 % (ref 37.5–51)
HGB BLD-MCNC: 16.8 G/DL (ref 13–17.7)
IMM GRANULOCYTES # BLD AUTO: 0 10*3/MM3 (ref 0–0.05)
IMM GRANULOCYTES NFR BLD AUTO: 0 % (ref 0–0.5)
LYMPHOCYTES # BLD AUTO: 1.81 10*3/MM3 (ref 0.7–3.1)
LYMPHOCYTES NFR BLD AUTO: 39.2 % (ref 19.6–45.3)
MCH RBC QN AUTO: 33.5 PG (ref 26.6–33)
MCHC RBC AUTO-ENTMCNC: 34.6 G/DL (ref 31.5–35.7)
MCV RBC AUTO: 96.6 FL (ref 79–97)
MONOCYTES # BLD AUTO: 0.63 10*3/MM3 (ref 0.1–0.9)
MONOCYTES NFR BLD AUTO: 13.6 % (ref 5–12)
NEUTROPHILS NFR BLD AUTO: 2 10*3/MM3 (ref 1.7–7)
NEUTROPHILS NFR BLD AUTO: 43.3 % (ref 42.7–76)
NRBC BLD AUTO-RTO: 0 /100 WBC (ref 0–0.2)
PLATELET # BLD AUTO: 307 10*3/MM3 (ref 140–450)
PMV BLD AUTO: 10.1 FL (ref 6–12)
POTASSIUM SERPL-SCNC: 4.1 MMOL/L (ref 3.5–5.2)
PROT SERPL-MCNC: 7.3 G/DL (ref 6–8.5)
RBC # BLD AUTO: 5.02 10*6/MM3 (ref 4.14–5.8)
SODIUM SERPL-SCNC: 137 MMOL/L (ref 136–145)
WBC NRBC COR # BLD AUTO: 4.62 10*3/MM3 (ref 3.4–10.8)

## 2025-02-18 PROCEDURE — 80053 COMPREHEN METABOLIC PANEL: CPT | Performed by: PREVENTIVE MEDICINE

## 2025-02-18 PROCEDURE — 36415 COLL VENOUS BLD VENIPUNCTURE: CPT

## 2025-02-18 PROCEDURE — 82306 VITAMIN D 25 HYDROXY: CPT | Performed by: PREVENTIVE MEDICINE

## 2025-02-18 PROCEDURE — 85025 COMPLETE CBC W/AUTO DIFF WBC: CPT | Performed by: PREVENTIVE MEDICINE

## 2025-02-19 ENCOUNTER — TELEPHONE (OUTPATIENT)
Dept: FAMILY MEDICINE CLINIC | Facility: CLINIC | Age: 45
End: 2025-02-19
Payer: COMMERCIAL

## 2025-02-19 NOTE — TELEPHONE ENCOUNTER
Sent message through [employer] TO RELAY ----- Message from Mary Ann Garza sent at 2/19/2025  6:49 AM EST -----  Vitamin D is low so I would increase to 1000 units daily over-the-counter.  Call if any other questions or concerns

## 2025-02-19 NOTE — PROGRESS NOTES
Vitamin D is low so I would increase to 1000 units daily over-the-counter.  Call if any other questions or concerns

## 2025-03-02 NOTE — PATIENT INSTRUCTIONS
There are no preventive care reminders to display for this patient. Check blood pressure cuff for accuracy and send 10 blood pressures over 2 weeks.  Watch sodium, alcohol and weight   Start Lisinopril and after on both X1 week collect blood pressures.

## 2025-03-03 ENCOUNTER — OFFICE VISIT (OUTPATIENT)
Dept: FAMILY MEDICINE CLINIC | Facility: CLINIC | Age: 45
End: 2025-03-03
Payer: COMMERCIAL

## 2025-03-03 VITALS
WEIGHT: 315 LBS | BODY MASS INDEX: 38.36 KG/M2 | SYSTOLIC BLOOD PRESSURE: 146 MMHG | HEART RATE: 74 BPM | DIASTOLIC BLOOD PRESSURE: 88 MMHG | OXYGEN SATURATION: 96 % | HEIGHT: 76 IN | TEMPERATURE: 98.4 F

## 2025-03-03 DIAGNOSIS — E66.01 CLASS 3 SEVERE OBESITY DUE TO EXCESS CALORIES WITH SERIOUS COMORBIDITY AND BODY MASS INDEX (BMI) OF 40.0 TO 44.9 IN ADULT: ICD-10-CM

## 2025-03-03 DIAGNOSIS — E66.813 CLASS 3 SEVERE OBESITY DUE TO EXCESS CALORIES WITH SERIOUS COMORBIDITY AND BODY MASS INDEX (BMI) OF 40.0 TO 44.9 IN ADULT: ICD-10-CM

## 2025-03-03 DIAGNOSIS — M25.512 ACUTE PAIN OF LEFT SHOULDER: ICD-10-CM

## 2025-03-03 DIAGNOSIS — I10 HYPERTENSION, UNSPECIFIED TYPE: ICD-10-CM

## 2025-03-03 DIAGNOSIS — M47.22 CERVICAL SPONDYLOSIS WITH RADICULOPATHY: Primary | ICD-10-CM

## 2025-03-03 PROCEDURE — 99214 OFFICE O/P EST MOD 30 MIN: CPT | Performed by: PREVENTIVE MEDICINE

## 2025-03-03 RX ORDER — LISINOPRIL 5 MG/1
5 TABLET ORAL DAILY
Qty: 90 TABLET | Refills: 3 | Status: SHIPPED | OUTPATIENT
Start: 2025-03-03

## 2025-03-03 RX ORDER — NEEDLES, DISPOSABLE 25GX5/8"
1 NEEDLE, DISPOSABLE MISCELLANEOUS WEEKLY
COMMUNITY
Start: 2025-01-22

## 2025-03-03 RX ORDER — SYRINGE WITH NEEDLE, 1 ML 25GX5/8"
1 SYRINGE, EMPTY DISPOSABLE MISCELLANEOUS WEEKLY
COMMUNITY
Start: 2025-02-25

## 2025-03-03 RX ORDER — AMLODIPINE BESYLATE 10 MG/1
10 TABLET ORAL DAILY
Qty: 90 TABLET | Refills: 3
Start: 2025-03-03

## 2025-03-03 NOTE — PROGRESS NOTES
Vivienne Mar is a 44 y.o. male presents for   Chief Complaint   Patient presents with    Shoulder Pain     Left arm. His index finger and middle finger are still somewhat numb. Burning pain on the back of his hand has eased up some, just not all the way gone.       There are no preventive care reminders to display for this patient.    History of Present Illness   History of Present Illness  The patient is a 44-year-old male who presents today for cervical spondylosis with radiculopathy, acute pain of the left shoulder, hypertension, and class III severe obesity due to excess calories with serious comorbidities and a body mass index of 40.    He reports a 10 percent improvement in his condition since the last visit, with persistent partial numbness in his index and middle fingers. He has been unable to sleep on his left side for the past 6 months due to discomfort. He has an upcoming appointment with a specialist in mid-April 2025. He has been attending physical therapy sessions twice a week for the past month but has not noticed any significant improvement. Previously, he experienced a burning sensation in his forearm, which has now shifted to the back of his fingers, although this is not a constant symptom. He also reports shoulder pain, which he believes is unrelated to his other symptoms. He is right-handed and has found that using a specific pillow alleviates his symptoms. His physical therapist has recommended further imaging studies.    He has been monitoring his blood pressure at home using two different machines, both of which have consistently shown elevated readings. Despite being on amlodipine 10 mg for several months, his blood pressure remains high. He has been off sulindac for approximately 3 weeks.    He acknowledges the need for weight loss but does not believe he can be prescribed medication for this purpose. He was previously diagnosed as prediabetic but managed to lose between 80  "to 100 pounds through dietary changes.    MEDICATIONS  Current: Amlodipine, Flexeril  Discontinued: Tramadol, sulindac    Vitals:    03/03/25 1358 03/03/25 1406   BP: 142/88 146/88   BP Location: Right arm Left arm   Patient Position: Sitting Sitting   Cuff Size: Large Adult Large Adult   Pulse: 74    Temp: 98.4 °F (36.9 °C)    TempSrc: Tympanic    SpO2: 96%    Weight: (!) 151 kg (331 lb 12.8 oz)    Height: 193 cm (75.98\")      Body mass index is 40.4 kg/m².    Current Outpatient Medications on File Prior to Visit   Medication Sig Dispense Refill    B-D 3CC LUER-INGE SYR 34YT5-6/2 23G X 1-1/2\" 3 ML misc Inject 1 each into the appropriate muscle as directed by prescriber 1 (One) Time Per Week.      BD Hypodermic Needle 18G X 1\" misc Inject 1 each into the appropriate muscle as directed by prescriber 1 (One) Time Per Week.      Testosterone Cypionate (DEPOTESTOTERONE CYPIONATE) 200 MG/ML injection Inject 0.6 mL into the appropriate muscle as directed by prescriber.      [DISCONTINUED] amLODIPine (NORVASC) 5 MG tablet Take 1 tablet by mouth Daily.      [DISCONTINUED] cyclobenzaprine (FLEXERIL) 5 MG tablet One tablet by mouth for severe arm pain (Patient not taking: Reported on 3/3/2025) 14 tablet 0    [DISCONTINUED] sulindac (CLINORIL) 200 MG tablet Take 1 tablet by mouth 2 (Two) Times a Day. (Patient not taking: Reported on 3/3/2025) 20 tablet 1    [DISCONTINUED] traMADol (ULTRAM) 50 MG tablet One tablet by mouth for severe arm/neck pain (Patient not taking: Reported on 3/3/2025) 14 tablet 0     No current facility-administered medications on file prior to visit.       The following portions of the patient's history were reviewed and updated as appropriate: allergies, current medications, past family history, past medical history, past social history, past surgical history, and problem list.    Review of Systems   Musculoskeletal:  Positive for myalgias and neck pain.   Neurological:  Positive for numbness. "       Objective   Physical Exam  Vitals reviewed.   Constitutional:       General: He is not in acute distress.     Appearance: Normal appearance. He is well-developed. He is not ill-appearing or toxic-appearing.   HENT:      Head: Normocephalic and atraumatic.      Nose: Nose normal.   Eyes:      Extraocular Movements: Extraocular movements intact.      Conjunctiva/sclera: Conjunctivae normal.      Pupils: Pupils are equal, round, and reactive to light.   Neck:      Comments: Full range of motion today of the cervical spine and Spurling's maneuver today was negative.  Cardiovascular:      Rate and Rhythm: Normal rate.   Pulmonary:      Effort: Pulmonary effort is normal.   Abdominal:      General: There is no distension.      Palpations: There is no mass.      Tenderness: There is no abdominal tenderness.   Musculoskeletal:      Comments: Approximately 120 degrees of abduction and anterior elevation of the left shoulder as compared to the right.  Decreased soft touch sensation over the dorsum of the second and third finger of the left hand.   Neurological:      Mental Status: He is alert and oriented to person, place, and time.   Psychiatric:         Mood and Affect: Mood normal.         Behavior: Behavior normal.       Physical Exam      PHQ-9 Total Score:    Results           Assessment & Plan   Diagnoses and all orders for this visit:    1. Cervical spondylosis with radiculopathy (Primary)  -     MRI Cervical Spine Without Contrast; Future    2. Acute pain of left shoulder    3. Hypertension, unspecified type    4. Class 3 severe obesity due to excess calories with serious comorbidity and body mass index (BMI) of 40.0 to 44.9 in adult    Other orders  -     amLODIPine (NORVASC) 10 MG tablet; Take 1 tablet by mouth Daily.  Dispense: 90 tablet; Refill: 3  -     lisinopril (PRINIVIL,ZESTRIL) 5 MG tablet; Take 1 tablet by mouth Daily.  Dispense: 90 tablet; Refill: 3      Assessment & Plan  1. Cervical spondylosis  with radiculopathy.  He reports partial numbness in the index and middle fingers and occasional burning in the dorsum of the left forearm. Physical examination reveals no significant weakness or change in sensation with various movements. An MRI of the cervical spine will be ordered to further evaluate the condition. He is advised to continue physical therapy sessions through March 2025. A referral to a neurosurgeon will be considered based on the MRI results. The appointment with the hand surgeon will likely be canceled as the issue appears to be neck-related.    2. Acute pain of the left shoulder.  He reports persistent pain in the left shoulder, which prevents him from sleeping on his left side. The pain predates the current symptoms and may be related to the cervical spondylosis. Imaging of the shoulder will be considered if the MRI of the cervical spine does not explain the symptoms.    3. Hypertension.  His blood pressure remains elevated despite being on amlodipine 10 mg daily. Lisinopril 5 mg will be added to his regimen for its antihypertensive and renal protective effects. He is instructed to monitor his blood pressure and provide 10 readings over the next 2 weeks. If his blood pressure remains uncontrolled, further adjustments to his medication will be considered.    4. Class III severe obesity.  He acknowledges the need to lose weight to help manage his hypertension and overall health. No specific weight loss medication is prescribed at this time, but lifestyle modifications, including diet and exercise, are encouraged.    Follow-up  The patient will follow up in 4 weeks unless he has consulted with the neurosurgeon in the interim.    Patient Instructions   There are no preventive care reminders to display for this patient. Check blood pressure cuff for accuracy and send 10 blood pressures over 2 weeks.  Watch sodium, alcohol and weight   Start Lisinopril and after on both X1 week collect blood  pressures.       Patient or patient representative verbalized consent for the use of Ambient Listening during the visit with  Mary Ann Garza MD for chart documentation. 3/3/2025  18:29 EST

## 2025-03-12 ENCOUNTER — HOSPITAL ENCOUNTER (OUTPATIENT)
Dept: MRI IMAGING | Facility: HOSPITAL | Age: 45
Discharge: HOME | End: 2025-03-12
Admitting: PREVENTIVE MEDICINE
Payer: COMMERCIAL

## 2025-03-12 DIAGNOSIS — M47.22 CERVICAL SPONDYLOSIS WITH RADICULOPATHY: ICD-10-CM

## 2025-03-12 PROCEDURE — 72141 MRI NECK SPINE W/O DYE: CPT

## 2025-03-31 NOTE — PROGRESS NOTES
"Subjective   History of Present Illness: Roberto Carlos Mar is a 45 y.o. male is being seen for consultation today at the request of Mary Ann Garza MD for neck and arm pain and hand paresthesias.  Patient's symptoms began mid January where he woke up with initially left lower cervical pain.  It did progress to radiation down into his forearm where he felt like his forearm was on fire.  He also experienced numbness and tingling in the 2nd and 3rd digits on his left hand.  Nothing would relieve the pain unless he lay down and propped his neck up.  He has been undergoing full course of physical therapy and overall has had significant resolution of his pain symptoms but still has some dullness along the 2nd and 3rd digit.  He does have chronic kidney disease and is contraindicated on the NSAIDs.  History of Present Illness    The following portions of the patient's history were reviewed and updated as appropriate: allergies, current medications, past family history, past medical history, past social history, past surgical history, and problem list.    Review of Systems   Constitutional:  Positive for activity change.   HENT: Negative.     Eyes: Negative.    Respiratory:  Negative for chest tightness and shortness of breath.    Cardiovascular: Negative.    Gastrointestinal: Negative.    Endocrine: Negative.    Musculoskeletal:  Positive for myalgias and neck pain.        + left arm and hand   Skin: Negative.    Allergic/Immunologic: Negative.    Neurological:  Positive for numbness. Negative for weakness.        + tingling   Hematological: Negative.    Psychiatric/Behavioral: Negative.         Objective     ./79   Pulse 85   Resp 18   Ht 193 cm (76\")   Wt (!) 149 kg (328 lb)   SpO2 97%   BMI 39.93 kg/m²    Body mass index is 39.93 kg/m².    There were no vitals filed for this visit.       Physical Exam  Neurological Exam  Upper extremity motor strength 5/5  Negative Robby    Assessment & Plan "   Independent Review of Radiographic Studies:      I personally reviewed the images from the following studies.    MRI cervical spine 3/12/2025    Multilevel DDD and DJD with no high-grade canal stenosis or cord signal change.  There is a bilateral disc osteophyte complex at C6-C7 with left over right foraminal stenosis.    Medical Decision Making:      Roberto Carlos Maris a 45 y.o. male being seen as a new patient for resolving acute left cervical radiculopathy. Imaging concordant with patient's symptoms.  His symptoms have essentially resolved he still has some numbness along the 2nd and 3rd digit and it is hard to say how much betterment or how long they will stay numb and patient is okay with that.  He is okay with extending his physical therapy and I will place him on some steroids to help with some additional inflammation.  I encouraged patient to call the office if his symptoms return and I would order a targeted injection for him.  Patient agrees with plan of care and wishes to proceed.  Diagnoses and all orders for this visit:    1. Cervical radiculopathy (Primary)  -     Ambulatory Referral to Physical Therapy for Evaluation & Treatment    Other orders  -     methylPREDNISolone (MEDROL) 4 MG dose pack; Take as directed on package instructions.  Dispense: 1 each; Refill: 0      Return if symptoms worsen or fail to improve.    This patient was examined wearing appropriate personal protective equipment.     Roberto Carlos Mar  reports that he has never smoked. He has never been exposed to tobacco smoke. He has never used smokeless tobacco.      Body mass index is 39.93 kg/m².  Class 2 Severe Obesity (BMI >=35 and <=39.9). Obesity-related health conditions include the following: hypertension and GERD. Obesity is unchanged. BMI is above average; BMI management plan is completed.  Recommendations for portion control and increasing exercise.     Patient's blood pressure was reviewed.  Recommendations for  a low-salt  diet and exercise to maintain/improve BP in addition to taking any presribed medications.             Monserrat Arriola DNP, APRN  04/04/25  15:02 EDT

## 2025-04-04 ENCOUNTER — OFFICE VISIT (OUTPATIENT)
Dept: FAMILY MEDICINE CLINIC | Facility: CLINIC | Age: 45
End: 2025-04-04
Payer: COMMERCIAL

## 2025-04-04 ENCOUNTER — OFFICE VISIT (OUTPATIENT)
Dept: NEUROSURGERY | Facility: CLINIC | Age: 45
End: 2025-04-04
Payer: COMMERCIAL

## 2025-04-04 VITALS
OXYGEN SATURATION: 97 % | RESPIRATION RATE: 18 BRPM | HEART RATE: 85 BPM | SYSTOLIC BLOOD PRESSURE: 131 MMHG | HEIGHT: 76 IN | WEIGHT: 315 LBS | DIASTOLIC BLOOD PRESSURE: 79 MMHG | BODY MASS INDEX: 38.36 KG/M2

## 2025-04-04 VITALS
WEIGHT: 315 LBS | SYSTOLIC BLOOD PRESSURE: 121 MMHG | OXYGEN SATURATION: 96 % | DIASTOLIC BLOOD PRESSURE: 82 MMHG | HEIGHT: 76 IN | HEART RATE: 84 BPM | TEMPERATURE: 97.1 F | BODY MASS INDEX: 38.36 KG/M2

## 2025-04-04 DIAGNOSIS — M54.50 LOW BACK PAIN RADIATING TO RIGHT LEG: ICD-10-CM

## 2025-04-04 DIAGNOSIS — M79.604 LOW BACK PAIN RADIATING TO RIGHT LEG: ICD-10-CM

## 2025-04-04 DIAGNOSIS — I10 HYPERTENSION, UNSPECIFIED TYPE: ICD-10-CM

## 2025-04-04 DIAGNOSIS — M47.22 CERVICAL SPONDYLOSIS WITH RADICULOPATHY: Primary | ICD-10-CM

## 2025-04-04 DIAGNOSIS — E66.01 CLASS 2 SEVERE OBESITY DUE TO EXCESS CALORIES WITH SERIOUS COMORBIDITY AND BODY MASS INDEX (BMI) OF 39.0 TO 39.9 IN ADULT: ICD-10-CM

## 2025-04-04 DIAGNOSIS — M54.12 CERVICAL RADICULOPATHY: Primary | ICD-10-CM

## 2025-04-04 DIAGNOSIS — E66.812 CLASS 2 SEVERE OBESITY DUE TO EXCESS CALORIES WITH SERIOUS COMORBIDITY AND BODY MASS INDEX (BMI) OF 39.0 TO 39.9 IN ADULT: ICD-10-CM

## 2025-04-04 PROCEDURE — 99213 OFFICE O/P EST LOW 20 MIN: CPT | Performed by: PREVENTIVE MEDICINE

## 2025-04-04 RX ORDER — METHYLPREDNISOLONE 4 MG/1
TABLET ORAL
Qty: 1 EACH | Refills: 0 | Status: SHIPPED | OUTPATIENT
Start: 2025-04-04

## 2025-04-04 NOTE — PROGRESS NOTES
"Vivienne Mar is a 45 y.o. male presents for   Chief Complaint   Patient presents with    Primary Care Follow-Up    Hypertension       There are no preventive care reminders to display for this patient.    Primary Care Follow-UpAssociated symptoms include: neck pain and myalgias.   Hypertension  Associated symptoms include neck pain.      History of Present Illness  The patient is a 45-year-old male who is here today for cervical spondylosis with left radiculopathy, hypertension, class II, severe obesity due to excess calories with serious comorbidities and a body mass index of 39.    He reports no change in his left upper extremity symptoms and is scheduled to see neurosurgery today. He has not experienced any new symptoms in his right arm, legs, bowel, or bladder. He has a history of herniated disks in his lower back, which have been a constant source of discomfort. He continues to struggle with sleep due to an inability to lie on his left side, a condition he has adapted to over time. He also experiences numbness in his 2nd and 3rd fingers, with minimal sensation in his thumb. He does not experience any shooting pain down his arm. He has expressed a reluctance to undergo surgery for his condition, as he has become accustomed to it and does not perceive it as a significant issue. He recalls that cervical traction exacerbated his symptoms when it was initially attempted.    He has been making efforts to manage his weight, including a recent trip to [place] where he adhered to a healthier diet.    Vitals:    04/04/25 0828 04/04/25 0830   BP: 134/83 121/82   BP Location: Left arm Right arm   Patient Position: Sitting Sitting   Cuff Size: Large Adult Large Adult   Pulse: 84    Temp: 97.1 °F (36.2 °C)    TempSrc: Infrared    SpO2: 96%    Weight: (!) 149 kg (328 lb 6.4 oz)    Height: 193 cm (75.98\")      Body mass index is 39.99 kg/m².    Current Outpatient Medications on File Prior to Visit   Medication " "Sig Dispense Refill    amLODIPine (NORVASC) 10 MG tablet Take 1 tablet by mouth Daily. 90 tablet 3    B-D 3CC LUER-INGE SYR 19WY1-9/2 23G X 1-1/2\" 3 ML misc Inject 1 each into the appropriate muscle as directed by prescriber 1 (One) Time Per Week.      BD Hypodermic Needle 18G X 1\" misc Inject 1 each into the appropriate muscle as directed by prescriber 1 (One) Time Per Week.      lisinopril (PRINIVIL,ZESTRIL) 5 MG tablet Take 1 tablet by mouth Daily. 90 tablet 3    Testosterone Cypionate (DEPOTESTOTERONE CYPIONATE) 200 MG/ML injection Inject 0.6 mL into the appropriate muscle as directed by prescriber.       No current facility-administered medications on file prior to visit.       The following portions of the patient's history were reviewed and updated as appropriate: allergies, current medications, past family history, past medical history, past social history, past surgical history, and problem list.    Review of Systems   Musculoskeletal:  Positive for myalgias, neck pain and neck stiffness.       Objective   Physical Exam  Vitals reviewed.   Constitutional:       General: He is not in acute distress.     Appearance: Normal appearance. He is well-developed. He is not ill-appearing or toxic-appearing.   HENT:      Head: Normocephalic and atraumatic.      Nose: Nose normal.   Eyes:      Extraocular Movements: Extraocular movements intact.      Conjunctiva/sclera: Conjunctivae normal.      Pupils: Pupils are equal, round, and reactive to light.   Neck:      Comments: Patient gets discomfort in the cervical spine at end range of motion with both rotations and with right lateral flexion.  Spurling's maneuver was negative  was strong patient does have numbness and tingling in fingers 2 and 3 and part of 4.  Biceps triceps and brachial radialis reflexes were equal to the right.  Per this examiner  Cardiovascular:      Rate and Rhythm: Normal rate.   Pulmonary:      Effort: Pulmonary effort is normal.   Abdominal: "      General: There is no distension.      Palpations: There is no mass.      Tenderness: There is no abdominal tenderness.   Musculoskeletal:         General: Tenderness present.   Neurological:      Mental Status: He is alert and oriented to person, place, and time.   Psychiatric:         Mood and Affect: Mood normal.         Behavior: Behavior normal.       Physical Exam  Cervical range of motion was performed. Reflexes were tested.    Vital Signs  BMI is 39.    PHQ-9 Total Score:    Results           Assessment & Plan   Diagnoses and all orders for this visit:    1. Cervical spondylosis with radiculopathy (Primary)    2. Low back pain radiating to right leg    3. Hypertension, unspecified type    4. Class 2 severe obesity due to excess calories with serious comorbidity and body mass index (BMI) of 39.0 to 39.9 in adult      Assessment & Plan  1. Cervical spondylosis with left radiculopathy.  The patient's symptoms are not indicative of a central spinal cord issue. He reports no change in his left upper extremity symptoms and has an appointment with neurosurgery today. An EMG nerve velocity conduction study test may be necessary to further evaluate his condition, particularly focusing on the C6-C7 and C7-T1 areas. He is advised to consult with the neurosurgeon regarding the potential need for surgery. If the neurosurgeon concurs with avoiding surgery, alternative treatments will be considered. Home traction units were discussed but deemed unsuitable due to previous worsening of symptoms with cervical traction.    2. Hypertension.  His blood pressure readings are within the normal range today.    3. Severe obesity.  He is advised to monitor his portion sizes and increase his physical activity, specifically walking. His weight has shown a downward trend, which is encouraging.    Follow-up  The patient will follow up in 6 months.    Patient Instructions   There are no preventive care reminders to display for this  patient.        Patient or patient representative verbalized consent for the use of Ambient Listening during the visit with  Mary Ann Garza MD for chart documentation. 4/4/2025  18:03 EDT